# Patient Record
Sex: FEMALE | Race: BLACK OR AFRICAN AMERICAN | NOT HISPANIC OR LATINO | URBAN - METROPOLITAN AREA
[De-identification: names, ages, dates, MRNs, and addresses within clinical notes are randomized per-mention and may not be internally consistent; named-entity substitution may affect disease eponyms.]

---

## 2024-11-05 ENCOUNTER — APPOINTMENT (OUTPATIENT)
Dept: OBSTETRICS AND GYNECOLOGY | Facility: CLINIC | Age: 29
End: 2024-11-05
Payer: COMMERCIAL

## 2024-11-05 ENCOUNTER — LAB (OUTPATIENT)
Dept: LAB | Facility: LAB | Age: 29
End: 2024-11-05
Payer: COMMERCIAL

## 2024-11-05 VITALS — DIASTOLIC BLOOD PRESSURE: 64 MMHG | SYSTOLIC BLOOD PRESSURE: 120 MMHG | WEIGHT: 293 LBS

## 2024-11-05 DIAGNOSIS — O99.210 OBESITY IN PREGNANCY (HHS-HCC): ICD-10-CM

## 2024-11-05 DIAGNOSIS — Z3A.34 34 WEEKS GESTATION OF PREGNANCY (HHS-HCC): ICD-10-CM

## 2024-11-05 DIAGNOSIS — O99.013 ANEMIA AFFECTING PREGNANCY IN THIRD TRIMESTER (HHS-HCC): Primary | ICD-10-CM

## 2024-11-05 DIAGNOSIS — O26.873 SHORT CERVICAL LENGTH DURING PREGNANCY IN THIRD TRIMESTER: ICD-10-CM

## 2024-11-05 DIAGNOSIS — O30.043 DICHORIONIC DIAMNIOTIC TWIN PREGNANCY IN THIRD TRIMESTER (HHS-HCC): Primary | ICD-10-CM

## 2024-11-05 LAB
ABO GROUP (TYPE) IN BLOOD: NORMAL
ALBUMIN SERPL BCP-MCNC: 3.4 G/DL (ref 3.4–5)
ALP SERPL-CCNC: 105 U/L (ref 33–110)
ALT SERPL W P-5'-P-CCNC: 11 U/L (ref 7–45)
ANION GAP SERPL CALC-SCNC: 15 MMOL/L (ref 10–20)
ANTIBODY SCREEN: NORMAL
AST SERPL W P-5'-P-CCNC: 17 U/L (ref 9–39)
BILIRUB SERPL-MCNC: 0.7 MG/DL (ref 0–1.2)
BUN SERPL-MCNC: 7 MG/DL (ref 6–23)
C TRACH RRNA SPEC QL NAA+PROBE: NEGATIVE
CALCIUM SERPL-MCNC: 8.9 MG/DL (ref 8.6–10.6)
CHLORIDE SERPL-SCNC: 104 MMOL/L (ref 98–107)
CO2 SERPL-SCNC: 22 MMOL/L (ref 21–32)
CREAT SERPL-MCNC: 0.79 MG/DL (ref 0.5–1.05)
EGFRCR SERPLBLD CKD-EPI 2021: >90 ML/MIN/1.73M*2
ERYTHROCYTE [DISTWIDTH] IN BLOOD BY AUTOMATED COUNT: 13.2 % (ref 11.5–14.5)
FERRITIN SERPL-MCNC: 19 NG/ML
FOLATE SERPL-MCNC: 17.7 NG/ML
GLUCOSE SERPL-MCNC: 78 MG/DL (ref 74–99)
HBV CORE AB SER QL: NONREACTIVE
HBV SURFACE AB SER-ACNC: <3.1 MIU/ML
HBV SURFACE AG SERPL QL IA: NONREACTIVE
HCT VFR BLD AUTO: 32.5 % (ref 36–46)
HCV AB SER QL: NONREACTIVE
HGB BLD-MCNC: 10.2 G/DL (ref 12–16)
HIV 1+2 AB+HIV1 P24 AG SERPL QL IA: NONREACTIVE
IRON SATN MFR SERPL: NORMAL %
IRON SERPL-MCNC: 69 UG/DL
MCH RBC QN AUTO: 27.2 PG (ref 26–34)
MCHC RBC AUTO-ENTMCNC: 31.4 G/DL (ref 32–36)
MCV RBC AUTO: 87 FL (ref 80–100)
N GONORRHOEA DNA SPEC QL PROBE+SIG AMP: NEGATIVE
NRBC BLD-RTO: 0 /100 WBCS (ref 0–0)
PLATELET # BLD AUTO: 347 X10*3/UL (ref 150–450)
POTASSIUM SERPL-SCNC: 4.4 MMOL/L (ref 3.5–5.3)
PROT SERPL-MCNC: 6.6 G/DL (ref 6.4–8.2)
RBC # BLD AUTO: 3.75 X10*6/UL (ref 4–5.2)
REFLEX ADDED, ANEMIA PANEL: NORMAL
RH FACTOR (ANTIGEN D): NORMAL
RUBV IGG SERPL IA-ACNC: 1.6 IA
RUBV IGG SERPL QL IA: POSITIVE
SODIUM SERPL-SCNC: 137 MMOL/L (ref 136–145)
TIBC SERPL-MCNC: NORMAL UG/DL
TREPONEMA PALLIDUM IGG+IGM AB [PRESENCE] IN SERUM OR PLASMA BY IMMUNOASSAY: NONREACTIVE
UIBC SERPL-MCNC: >450 UG/DL
VIT B12 SERPL-MCNC: 411 PG/ML
WBC # BLD AUTO: 9.7 X10*3/UL (ref 4.4–11.3)

## 2024-11-05 PROCEDURE — 90471 IMMUNIZATION ADMIN: CPT | Performed by: STUDENT IN AN ORGANIZED HEALTH CARE EDUCATION/TRAINING PROGRAM

## 2024-11-05 PROCEDURE — 82746 ASSAY OF FOLIC ACID SERUM: CPT

## 2024-11-05 PROCEDURE — 86803 HEPATITIS C AB TEST: CPT

## 2024-11-05 PROCEDURE — 86706 HEP B SURFACE ANTIBODY: CPT

## 2024-11-05 PROCEDURE — 86900 BLOOD TYPING SEROLOGIC ABO: CPT

## 2024-11-05 PROCEDURE — 86850 RBC ANTIBODY SCREEN: CPT

## 2024-11-05 PROCEDURE — 87340 HEPATITIS B SURFACE AG IA: CPT

## 2024-11-05 PROCEDURE — 83021 HEMOGLOBIN CHROMOTOGRAPHY: CPT

## 2024-11-05 PROCEDURE — 86704 HEP B CORE ANTIBODY TOTAL: CPT

## 2024-11-05 PROCEDURE — 82728 ASSAY OF FERRITIN: CPT

## 2024-11-05 PROCEDURE — 85027 COMPLETE CBC AUTOMATED: CPT

## 2024-11-05 PROCEDURE — 87086 URINE CULTURE/COLONY COUNT: CPT

## 2024-11-05 PROCEDURE — 36415 COLL VENOUS BLD VENIPUNCTURE: CPT

## 2024-11-05 PROCEDURE — 99204 OFFICE O/P NEW MOD 45 MIN: CPT | Performed by: STUDENT IN AN ORGANIZED HEALTH CARE EDUCATION/TRAINING PROGRAM

## 2024-11-05 PROCEDURE — 83550 IRON BINDING TEST: CPT

## 2024-11-05 PROCEDURE — 90715 TDAP VACCINE 7 YRS/> IM: CPT | Performed by: STUDENT IN AN ORGANIZED HEALTH CARE EDUCATION/TRAINING PROGRAM

## 2024-11-05 PROCEDURE — 86901 BLOOD TYPING SEROLOGIC RH(D): CPT

## 2024-11-05 PROCEDURE — 80053 COMPREHEN METABOLIC PANEL: CPT

## 2024-11-05 PROCEDURE — 83020 HEMOGLOBIN ELECTROPHORESIS: CPT | Performed by: STUDENT IN AN ORGANIZED HEALTH CARE EDUCATION/TRAINING PROGRAM

## 2024-11-05 PROCEDURE — 86317 IMMUNOASSAY INFECTIOUS AGENT: CPT

## 2024-11-05 PROCEDURE — 82607 VITAMIN B-12: CPT

## 2024-11-05 PROCEDURE — 87389 HIV-1 AG W/HIV-1&-2 AB AG IA: CPT

## 2024-11-05 PROCEDURE — 87591 N.GONORRHOEAE DNA AMP PROB: CPT

## 2024-11-05 PROCEDURE — 87491 CHLMYD TRACH DNA AMP PROBE: CPT

## 2024-11-05 PROCEDURE — 86780 TREPONEMA PALLIDUM: CPT

## 2024-11-05 PROCEDURE — 1036F TOBACCO NON-USER: CPT | Performed by: STUDENT IN AN ORGANIZED HEALTH CARE EDUCATION/TRAINING PROGRAM

## 2024-11-05 RX ORDER — FERROUS SULFATE 325(65) MG
325 TABLET ORAL
Qty: 30 TABLET | Refills: 11 | Status: SHIPPED | OUTPATIENT
Start: 2024-11-05 | End: 2025-11-05

## 2024-11-05 RX ORDER — ASCORBIC ACID, CHOLECALCIFEROL, .ALPHA.-TOCOPHEROL ACETATE, DL-, PYRIDOXINE, FOLIC ACID, CYANOCOBALAMIN, CALCIUM, FERROUS FUMARATE, MAGNESIUM, DOCONEXENT 85; 200; 10; 25; 1; 12; 140; 27; 45; 300 [IU]/1; [IU]/1; [IU]/1; [IU]/1; MG/1; UG/1; MG/1; MG/1; MG/1; MG/1
CAPSULE, GELATIN COATED ORAL
COMMUNITY
Start: 2024-10-29

## 2024-11-05 ASSESSMENT — EDINBURGH POSTNATAL DEPRESSION SCALE (EPDS)
I HAVE BEEN SO UNHAPPY THAT I HAVE HAD DIFFICULTY SLEEPING: NOT AT ALL
I HAVE LOOKED FORWARD WITH ENJOYMENT TO THINGS: AS MUCH AS I EVER DID
I HAVE BEEN SO UNHAPPY THAT I HAVE BEEN CRYING: NO, NEVER
THE THOUGHT OF HARMING MYSELF HAS OCCURRED TO ME: NEVER
THINGS HAVE BEEN GETTING ON TOP OF ME: NO, I HAVE BEEN COPING AS WELL AS EVER
I HAVE FELT SCARED OR PANICKY FOR NO GOOD REASON: NO, NOT AT ALL
TOTAL SCORE: 0
I HAVE BEEN ABLE TO LAUGH AND SEE THE FUNNY SIDE OF THINGS: AS MUCH AS I ALWAYS COULD
I HAVE FELT SAD OR MISERABLE: NO, NOT AT ALL
I HAVE BLAMED MYSELF UNNECESSARILY WHEN THINGS WENT WRONG: NO, NEVER
I HAVE BEEN ANXIOUS OR WORRIED FOR NO GOOD REASON: NO, NOT AT ALL

## 2024-11-05 ASSESSMENT — ENCOUNTER SYMPTOMS
PSYCHIATRIC NEGATIVE: 0
GASTROINTESTINAL NEGATIVE: 0
CONSTITUTIONAL NEGATIVE: 0
ENDOCRINE NEGATIVE: 0
EYES NEGATIVE: 0
NEUROLOGICAL NEGATIVE: 0
HEMATOLOGIC/LYMPHATIC NEGATIVE: 0
MUSCULOSKELETAL NEGATIVE: 0
ALLERGIC/IMMUNOLOGIC NEGATIVE: 0
CARDIOVASCULAR NEGATIVE: 0
RESPIRATORY NEGATIVE: 0

## 2024-11-05 NOTE — PROGRESS NOTES
Alcides Argueta is a 29 y.o. female who is here for an initial OB visit.    Pregnancy notable for:  - di di twin pregnancy  - childhood asthma, not meds currently  - short cervical length, US at OSH with 19mm cervix without funneling  - obesity    Previous pregnancy history:   Thyroid Disease: denies  History of chronic hypertension: denies  History of pre-existing diabetes: denies  Social History     Substance and Sexual Activity   Sexual Activity Not Currently     FamHx: HTN, DM  FamHx Breast/Ov/Colon cancer: cousin breast ca  STIs: none  HSV: denies  Sexual, physical, mental abuse: denies  Occupation: denies  Taking prenatals, not on ASA.    Objective   /64   Wt 136 kg (300 lb)   LMP 03/12/2024 (Exact Date)   Physical Exam  Constitutional:       Appearance: Normal appearance.   HENT:      Head: Normocephalic and atraumatic.      Mouth/Throat:      Mouth: Mucous membranes are moist.   Eyes:      Extraocular Movements: Extraocular movements intact.      Conjunctiva/sclera: Conjunctivae normal.      Pupils: Pupils are equal, round, and reactive to light.   Pulmonary:      Effort: Pulmonary effort is normal.   Abdominal:      General: Abdomen is flat.      Palpations: Abdomen is soft.   Musculoskeletal:         General: Normal range of motion.      Cervical back: Normal range of motion.   Neurological:      General: No focal deficit present.      Mental Status: She is alert.   Skin:     General: Skin is warm and dry.   Psychiatric:         Mood and Affect: Mood normal.         Behavior: Behavior normal.         Thought Content: Thought content normal.         Judgment: Judgment normal.         Orders Placed This Encounter   Procedures    Urine culture     Order Specific Question:   Release result to [employer]     Answer:   Immediate [1]    US MAC OB imaging order     Standing Status:   Future     Standing Expiration Date:   11/5/2025     Order Specific Question:   Reason for exam:     Answer:   anatomy,  late transfer of care, twins     Order Specific Question:   Radiologist to Determine Optimal Study     Answer:   Yes     Order Specific Question:   Release result to MyChart     Answer:   Immediate [1]     Order Specific Question:   Is this exam part of a Research Study? If Yes, link this order to the research study     Answer:   No    Tdap vaccine, age 7 years and older  (BOOSTRIX)    C. trachomatis / N. gonorrhoeae, Amplified     Order Specific Question:   Release result to MyChart     Answer:   Immediate    CBC Anemia Panel With Reflex,Pregnancy     Standing Status:   Future     Standing Expiration Date:   11/5/2025     Order Specific Question:   Release result to MyChart     Answer:   Immediate [1]    Hemoglobin Identification with Path Review     Standing Status:   Future     Standing Expiration Date:   11/5/2025     Order Specific Question:   Release result to MyChart     Answer:   Immediate [1]    Hepatitis B Core Antibody, Total     Standing Status:   Future     Standing Expiration Date:   11/5/2025     Order Specific Question:   Release result to MyChart     Answer:   Immediate [1]    Hepatitis B surface Ag     Standing Status:   Future     Standing Expiration Date:   11/5/2025     Order Specific Question:   Release result to MyChart     Answer:   Immediate [1]    Hepatitis B Surface Antibody     Standing Status:   Future     Standing Expiration Date:   11/5/2025     Order Specific Question:   Release result to MyChart     Answer:   Immediate [1]    Hepatitis C Antibody     Standing Status:   Future     Standing Expiration Date:   11/5/2025     Order Specific Question:   Release result to MyChart     Answer:   Immediate [1]    HIV 1/2 Antigen/Antibody Screen with Reflex to Confirmation     Standing Status:   Future     Standing Expiration Date:   11/5/2025     Order Specific Question:   Release result to MyChart     Answer:   Immediate [1]    Rubella IgG     Standing Status:   Future     Standing Expiration  Date:   11/5/2025     Order Specific Question:   Release result to MyChart     Answer:   Immediate [1]    Syphilis Screen with Reflex     Standing Status:   Future     Standing Expiration Date:   11/5/2025     Order Specific Question:   Release result to MyChart     Answer:   Immediate [1]    Type And Screen     Standing Status:   Future     Standing Expiration Date:   11/5/2025     Order Specific Question:   Release result to MyChart     Answer:   Immediate [1]    Protein, Urine Random     Order Specific Question:   Release result to MyChart     Answer:   Immediate [1]    Comprehensive Metabolic Panel     Standing Status:   Future     Standing Expiration Date:   11/5/2025     Order Specific Question:   Release result to MyChart     Answer:   Immediate [1]       Problem List Items Addressed This Visit       34 weeks gestation of pregnancy ([facility])    Overview     Desired provider in labor: [] CNM  [x] Physician  [x] Blood Products: [x] Yes, accepts [] No, needs counseling  [x] Initial BMI: Could not be calculated   [] Prenatal Labs: unable to review, ordered  [] Cervical Cancer Screening: unable to see in chart review  [] Rh status:   [] Genetic Screening:    [] NT US: (11-13 wks)  [] Baby ASA (if indicated):  [] Pregnancy dated by:     [] Anatomy US: (19-20 wks)  [] Federal Sterilization consent signed (if indicated):  [x] 1hr GCT at 24-28wks: 111 (reviewed in chart)  [] Rhogam (if indicated):   [] Fetal Surveillance (if indicated):  [x] Tdap (27-32 wks, may be given up to 36 wks if initial window missed): 11/5  [] RSV (32-36 wks) (Sept. to end of Jan): do not have in the office  [] Flu Vaccine: uncertain    [] Breastfeeding:  [] Postpartum Birth control method: declines  [] GBS at 36 - 37 wks:  [] 39 weeks discussion of IOL vs. Expectant management:  [] Mode of delivery ( anticipated ):          Relevant Orders    C. trachomatis / N. gonorrhoeae, Amplified    CBC Anemia Panel With Reflex,Pregnancy    Hemoglobin  Identification with Path Review    Hepatitis B Core Antibody, Total    Hepatitis B surface Ag    Hepatitis B Surface Antibody    Hepatitis C Antibody    HIV 1/2 Antigen/Antibody Screen with Reflex to Confirmation    Rubella IgG    Syphilis Screen with Reflex    Type And Screen    Urine culture    Protein, Urine Random    Comprehensive Metabolic Panel    US MAC OB imaging order    Follow Up In Obstetrics    Short cervical length during pregnancy in third trimester    Obesity in pregnancy ([facility])    Dichorionic diamniotic twin pregnancy in third trimester ([facility]) - Primary      Labs & imaging ordered  Thank you for coming to your OB visit for your pregnancy. Follow up in 1 week.     Tete De La Rosa MD

## 2024-11-06 ENCOUNTER — HOSPITAL ENCOUNTER (OUTPATIENT)
Dept: RADIOLOGY | Facility: HOSPITAL | Age: 29
Discharge: HOME | End: 2024-11-06
Payer: COMMERCIAL

## 2024-11-06 DIAGNOSIS — Z3A.34 34 WEEKS GESTATION OF PREGNANCY (HHS-HCC): ICD-10-CM

## 2024-11-06 LAB
BACTERIA UR CULT: NORMAL
HEMOGLOBIN A2: 1.7 % (ref 2–3.5)
HEMOGLOBIN A: 96.8 % (ref 95.8–98)
HEMOGLOBIN F: 0.3 % (ref 0–2)
HEMOGLOBIN IDENTIFICATION INTERPRETATION: ABNORMAL
HEMOGLOBIN OTHER: 1.2 %
PATH REVIEW-HGB IDENTIFICATION: ABNORMAL

## 2024-11-06 PROCEDURE — 76819 FETAL BIOPHYS PROFIL W/O NST: CPT

## 2024-11-06 PROCEDURE — 76811 OB US DETAILED SNGL FETUS: CPT

## 2024-11-06 PROCEDURE — 76811 OB US DETAILED SNGL FETUS: CPT | Performed by: OBSTETRICS & GYNECOLOGY

## 2024-11-06 PROCEDURE — 76817 TRANSVAGINAL US OBSTETRIC: CPT | Performed by: OBSTETRICS & GYNECOLOGY

## 2024-11-06 PROCEDURE — 76812 OB US DETAILED ADDL FETUS: CPT | Performed by: OBSTETRICS & GYNECOLOGY

## 2024-11-06 PROCEDURE — 76819 FETAL BIOPHYS PROFIL W/O NST: CPT | Performed by: OBSTETRICS & GYNECOLOGY

## 2024-11-11 ENCOUNTER — ROUTINE PRENATAL (OUTPATIENT)
Dept: OBSTETRICS AND GYNECOLOGY | Facility: HOSPITAL | Age: 29
End: 2024-11-11
Payer: COMMERCIAL

## 2024-11-11 VITALS — WEIGHT: 293 LBS | SYSTOLIC BLOOD PRESSURE: 123 MMHG | DIASTOLIC BLOOD PRESSURE: 79 MMHG

## 2024-11-11 DIAGNOSIS — O99.013 ANEMIA AFFECTING PREGNANCY IN THIRD TRIMESTER (HHS-HCC): ICD-10-CM

## 2024-11-11 DIAGNOSIS — O30.043 DICHORIONIC DIAMNIOTIC TWIN PREGNANCY IN THIRD TRIMESTER (HHS-HCC): ICD-10-CM

## 2024-11-11 DIAGNOSIS — Z3A.34 34 WEEKS GESTATION OF PREGNANCY (HHS-HCC): Primary | ICD-10-CM

## 2024-11-11 PROCEDURE — 99214 OFFICE O/P EST MOD 30 MIN: CPT | Mod: TH | Performed by: STUDENT IN AN ORGANIZED HEALTH CARE EDUCATION/TRAINING PROGRAM

## 2024-11-11 PROCEDURE — 90678 RSV VACC PREF BIVALENT IM: CPT | Performed by: STUDENT IN AN ORGANIZED HEALTH CARE EDUCATION/TRAINING PROGRAM

## 2024-11-11 PROCEDURE — 87081 CULTURE SCREEN ONLY: CPT | Performed by: STUDENT IN AN ORGANIZED HEALTH CARE EDUCATION/TRAINING PROGRAM

## 2024-11-11 RX ORDER — POLYETHYLENE GLYCOL 3350 17 G/17G
17 POWDER, FOR SOLUTION ORAL DAILY
Qty: 510 G | Refills: 0 | Status: SHIPPED | OUTPATIENT
Start: 2024-11-11

## 2024-11-11 RX ORDER — POLYETHYLENE GLYCOL 3350 17 G/17G
17 POWDER, FOR SOLUTION ORAL DAILY
Qty: 30 PACKET | Refills: 0 | Status: SHIPPED | OUTPATIENT
Start: 2024-11-11 | End: 2024-11-11

## 2024-11-11 NOTE — PROGRESS NOTES
Ob Visit  24     SUBJECTIVE    HPI: Austin Argueta is a 29 y.o.  at 34w6d here for RPNV.   Having some stable pelvic pain, declines exam.    OBJECTIVE  Visit Vitals  /79   Wt 136 kg (300 lb)   LMP 2024 (Exact Date)   OB Status Pregnant   Smoking Status Never      ASSESSMENT & PLAN    Austin Argueta is a 29 y.o.  at 34w6d here for the following concerns we addressed today:    Problem List Items Addressed This Visit       34 weeks gestation of pregnancy ([facility]) - Primary    Overview     Desired provider in labor: [] CNM  [x] Physician  [x] Blood Products: [x] Yes, accepts [] No, needs counseling  [x] Initial BMI: Could not be calculated   [x] Prenatal Labs: reviewed  [] Cervical Cancer Screening: unable to see in chart review  [x] Rh status: +  [] Genetic Screening:    [] NT US: (11-13 wks)  [] Baby ASA (if indicated):  [x] Pregnancy dated by: LMP cw OSH dating (& cw our 34wk scan)    [x] Anatomy US: (19-20 wks): late anatomy at [facility]  [] Federal Sterilization consent signed (if indicated):  [x] 1hr GCT at 24-28wks: 111 (reviewed in chart)  [] Rhogam (if indicated):   [] Fetal Surveillance (if indicated):  [x] Tdap (27-32 wks, may be given up to 36 wks if initial window missed):   [x] RSV (32-36 wks) (Sept. to end of ):   [] Flu Vaccine: uncertain    [] Breastfeeding:  [] Postpartum Birth control method: declines  [] GBS at 36 - 37 wks:   [] IOL vs. Expectant management: labor induction at 38wks  [] Mode of delivery ( anticipated ):          Relevant Orders    Group B Streptococcus (GBS) Prenatal Screen, Culture    Follow Up In Obstetrics    Dichorionic diamniotic twin pregnancy in third trimester ([facility])    Overview     Cephalic/transverse. Concordant growth at 34 weeks, repeat evaluation scheduled.   Would desires IOL & breech extraction if needed.          Anemia affecting pregnancy in third trimester ([facility])    Overview     Hgb 10.2 on initial transfer labs.    PO iron, miralax for constipation         Relevant Medications    polyethylene glycol (Glycolax, Miralax) 17 gram packet        GBS & RSV today. RTC in 1 week.       Tete De La Rosa MD

## 2024-11-13 LAB — GP B STREP GENITAL QL CULT: NORMAL

## 2024-11-18 ENCOUNTER — APPOINTMENT (OUTPATIENT)
Dept: OBSTETRICS AND GYNECOLOGY | Facility: HOSPITAL | Age: 29
End: 2024-11-18
Payer: COMMERCIAL

## 2024-11-18 VITALS — DIASTOLIC BLOOD PRESSURE: 74 MMHG | SYSTOLIC BLOOD PRESSURE: 131 MMHG | WEIGHT: 293 LBS

## 2024-11-18 DIAGNOSIS — O30.043 DICHORIONIC DIAMNIOTIC TWIN PREGNANCY IN THIRD TRIMESTER (HHS-HCC): ICD-10-CM

## 2024-11-18 DIAGNOSIS — Z3A.35 35 WEEKS GESTATION OF PREGNANCY (HHS-HCC): Primary | ICD-10-CM

## 2024-11-18 PROCEDURE — 99214 OFFICE O/P EST MOD 30 MIN: CPT | Mod: TH | Performed by: STUDENT IN AN ORGANIZED HEALTH CARE EDUCATION/TRAINING PROGRAM

## 2024-11-18 PROCEDURE — 99214 OFFICE O/P EST MOD 30 MIN: CPT | Performed by: STUDENT IN AN ORGANIZED HEALTH CARE EDUCATION/TRAINING PROGRAM

## 2024-11-18 NOTE — PROGRESS NOTES
Ob Visit  24     SUBJECTIVE      HPI: Austin Argueta is a 29 y.o.  at 35w6d here for RPNV.  She has no contractions, no bleeding, or no LOF. Reports normal fetal movement. Patient reports has not been able to  her iron pill yet. Also considering c/s as opposed to IOL. Will consider throughout this week.     OBJECTIVE  Visit Vitals  LMP 2024 (Exact Date)   OB Status Pregnant   Smoking Status Never      ASSESSMENT & PLAN    Austin Argueta is a 29 y.o.  at 35w6d here for the following concerns we addressed today:    Problem List Items Addressed This Visit       35 weeks gestation of pregnancy ([facility]) - Primary    Overview     Desired provider in labor: [] CNM  [x] Physician  [x] Blood Products: [x] Yes, accepts [] No, needs counseling  [x] Initial BMI: Could not be calculated   [x] Prenatal Labs: reviewed  [] Cervical Cancer Screening: unable to see in chart review but pt reported normal 2024  [x] Rh status: +  [] Genetic Screening:    [] NT US: (11-13 wks)  [] Baby ASA (if indicated):  [x] Pregnancy dated by: LMP cw OSH dating (& cw our 34wk scan)    [x] Anatomy US: (19-20 wks): late anatomy at [facility]  [] Federal Sterilization consent signed (if indicated):  [x] 1hr GCT at 24-28wks: 111 (reviewed in chart)  [] Rhogam (if indicated):   [] Fetal Surveillance (if indicated):  [x] Tdap (27-32 wks, may be given up to 36 wks if initial window missed):   [x] RSV (32-36 wks) (Sept. to end of ):   [] Flu Vaccine: uncertain    [] Breastfeeding:  [] Postpartum Birth control method: declines  [x] GBS at 36 - 37 wks: neg  [] IOL vs. Expectant management: labor induction at 38wks  [] Mode of delivery ( anticipated ):          Relevant Orders    Follow Up In Obstetrics    Dichorionic diamniotic twin pregnancy in third trimester ([facility])    Overview     Cephalic/transverse. Concordant growth at 34 weeks, repeat evaluation scheduled.   Would desires IOL & breech extraction if needed.           Relevant Orders    Follow Up In Obstetrics       RTC in 1 week. Will schedule 38wk c/s vs induction at that time. Has repeat US on 11/29.    Tete De La Rosa MD

## 2024-11-25 ENCOUNTER — HOSPITAL ENCOUNTER (OUTPATIENT)
Facility: HOSPITAL | Age: 29
Setting detail: SURGERY ADMIT
End: 2024-11-25
Attending: STUDENT IN AN ORGANIZED HEALTH CARE EDUCATION/TRAINING PROGRAM | Admitting: STUDENT IN AN ORGANIZED HEALTH CARE EDUCATION/TRAINING PROGRAM
Payer: COMMERCIAL

## 2024-11-25 ENCOUNTER — PREP FOR PROCEDURE (OUTPATIENT)
Dept: OBSTETRICS AND GYNECOLOGY | Facility: HOSPITAL | Age: 29
End: 2024-11-25

## 2024-11-25 ENCOUNTER — ROUTINE PRENATAL (OUTPATIENT)
Dept: OBSTETRICS AND GYNECOLOGY | Facility: HOSPITAL | Age: 29
End: 2024-11-25
Payer: COMMERCIAL

## 2024-11-25 VITALS — WEIGHT: 293 LBS | DIASTOLIC BLOOD PRESSURE: 86 MMHG | SYSTOLIC BLOOD PRESSURE: 142 MMHG

## 2024-11-25 DIAGNOSIS — O30.043 DICHORIONIC DIAMNIOTIC TWIN PREGNANCY IN THIRD TRIMESTER (HHS-HCC): Primary | ICD-10-CM

## 2024-11-25 DIAGNOSIS — Z3A.36 36 WEEKS GESTATION OF PREGNANCY (HHS-HCC): ICD-10-CM

## 2024-11-25 DIAGNOSIS — O16.3 ELEVATED BLOOD PRESSURE AFFECTING PREGNANCY IN THIRD TRIMESTER, ANTEPARTUM (HHS-HCC): Primary | ICD-10-CM

## 2024-11-25 PROCEDURE — 99214 OFFICE O/P EST MOD 30 MIN: CPT | Performed by: STUDENT IN AN ORGANIZED HEALTH CARE EDUCATION/TRAINING PROGRAM

## 2024-11-25 PROCEDURE — 99214 OFFICE O/P EST MOD 30 MIN: CPT | Mod: TH | Performed by: STUDENT IN AN ORGANIZED HEALTH CARE EDUCATION/TRAINING PROGRAM

## 2024-11-25 RX ORDER — ACETAMINOPHEN 500 MG
1 TABLET ORAL 2 TIMES DAILY
Qty: 1 KIT | Refills: 0 | Status: ON HOLD | OUTPATIENT
Start: 2024-11-25

## 2024-11-25 ASSESSMENT — ENCOUNTER SYMPTOMS
LOSS OF SENSATION IN FEET: 0
DEPRESSION: 0
OCCASIONAL FEELINGS OF UNSTEADINESS: 0

## 2024-11-25 NOTE — PROGRESS NOTES
Ob Visit  24     SUBJECTIVE      HPI: Austin Argueta is a 29 y.o.  at 36w6d here for RPNV.  She has no contractions, no bleeding, or no LOF. Reports normal fetal movement.  Denies PEC sympts.    OBJECTIVE  Visit Vitals  /86   Wt 138 kg (305 lb)   LMP 2024 (Exact Date)   OB Status Pregnant   Smoking Status Never      ASSESSMENT & PLAN    Austin Argueta is a 29 y.o.  at 36w6d here for the following concerns we addressed today:    Problem List Items Addressed This Visit       36 weeks gestation of pregnancy ([facility])    Overview     Desired provider in labor: [] CNM  [x] Physician  [x] Blood Products: [x] Yes, accepts [] No, needs counseling  [x] Initial BMI: Could not be calculated   [x] Prenatal Labs: reviewed  [] Cervical Cancer Screening: unable to see in chart review but pt reported normal 2024  [x] Rh status: +  [] Genetic Screening:    [] NT US: (11-13 wks)  [] Baby ASA (if indicated):  [x] Pregnancy dated by: LMP cw OSH dating (& cw our 34wk scan)    [x] Anatomy US: (19-20 wks): late anatomy at [facility]  [] Federal Sterilization consent signed (if indicated):  [x] 1hr GCT at 24-28wks: 111 (reviewed in chart)  [] Rhogam (if indicated):   [] Fetal Surveillance (if indicated):  [x] Tdap (27-32 wks, may be given up to 36 wks if initial window missed):   [x] RSV (32-36 wks) (Sept. to end of ):   [] Flu Vaccine: uncertain    [] Breastfeeding:  [] Postpartum Birth control method: declines  [x] GBS at 36 - 37 wks: neg  [x] IOL vs. Expectant management:  delivery at 38wks  [x] Mode of delivery ( anticipated ):          Relevant Orders    Follow Up In Obstetrics    Follow Up In Obstetrics    Elevated blood pressure affecting pregnancy in third trimester, antepartum ([facility]) - Primary    Relevant Medications    blood pressure monitor (Blood Pressure Kit) kit    Other Relevant Orders    Follow Up In Obstetrics    Follow Up In Obstetrics       Counseled patient on  elevated BP in pregnancy and discussed s/s preeclampsia. Sent BP kit to pharmacy today, she understands to take BP 2x daily. RTC Friday for BP check at time of other testing.  CANDIDA in 1 week. Schedule  delivery today for 38wks (patient desires  instead of labor induction).     Tete De La Rosa MD

## 2024-11-29 ENCOUNTER — ANESTHESIA (OUTPATIENT)
Dept: OBSTETRICS AND GYNECOLOGY | Facility: HOSPITAL | Age: 29
End: 2024-11-29
Payer: COMMERCIAL

## 2024-11-29 ENCOUNTER — HOSPITAL ENCOUNTER (INPATIENT)
Facility: HOSPITAL | Age: 29
End: 2024-11-29
Attending: STUDENT IN AN ORGANIZED HEALTH CARE EDUCATION/TRAINING PROGRAM | Admitting: STUDENT IN AN ORGANIZED HEALTH CARE EDUCATION/TRAINING PROGRAM
Payer: COMMERCIAL

## 2024-11-29 ENCOUNTER — HOSPITAL ENCOUNTER (OUTPATIENT)
Dept: RADIOLOGY | Facility: HOSPITAL | Age: 29
Discharge: HOME | End: 2024-11-29
Payer: COMMERCIAL

## 2024-11-29 ENCOUNTER — ANESTHESIA EVENT (OUTPATIENT)
Dept: OBSTETRICS AND GYNECOLOGY | Facility: HOSPITAL | Age: 29
End: 2024-11-29
Payer: COMMERCIAL

## 2024-11-29 DIAGNOSIS — O99.013 ANEMIA AFFECTING PREGNANCY IN THIRD TRIMESTER (HHS-HCC): ICD-10-CM

## 2024-11-29 DIAGNOSIS — O09.299 HX OF MATERNAL LACERATION, 3RD DEGREE, CURRENTLY PREGNANT (HHS-HCC): ICD-10-CM

## 2024-11-29 DIAGNOSIS — O30.049: ICD-10-CM

## 2024-11-29 DIAGNOSIS — O30.043 DICHORIONIC DIAMNIOTIC TWIN PREGNANCY IN THIRD TRIMESTER (HHS-HCC): ICD-10-CM

## 2024-11-29 DIAGNOSIS — Z3A.37 37 WEEKS GESTATION OF PREGNANCY (HHS-HCC): ICD-10-CM

## 2024-11-29 DIAGNOSIS — Z3A.34 34 WEEKS GESTATION OF PREGNANCY (HHS-HCC): ICD-10-CM

## 2024-11-29 PROBLEM — O13.3 GESTATIONAL HYPERTENSION, THIRD TRIMESTER (HHS-HCC): Status: ACTIVE | Noted: 2024-11-25

## 2024-11-29 PROBLEM — I10 HTN (HYPERTENSION): Status: ACTIVE | Noted: 2024-11-29

## 2024-11-29 LAB
ABO GROUP (TYPE) IN BLOOD: NORMAL
ALBUMIN SERPL BCP-MCNC: 3.5 G/DL (ref 3.4–5)
ALP SERPL-CCNC: 132 U/L (ref 33–110)
ALT SERPL W P-5'-P-CCNC: 10 U/L (ref 7–45)
ANION GAP SERPL CALC-SCNC: 16 MMOL/L (ref 10–20)
ANTIBODY SCREEN: NORMAL
AST SERPL W P-5'-P-CCNC: 18 U/L (ref 9–39)
BILIRUB SERPL-MCNC: 0.6 MG/DL (ref 0–1.2)
BLOOD EXPIRATION DATE: NORMAL
BUN SERPL-MCNC: 11 MG/DL (ref 6–23)
CALCIUM SERPL-MCNC: 8.4 MG/DL (ref 8.6–10.6)
CHLORIDE SERPL-SCNC: 106 MMOL/L (ref 98–107)
CO2 SERPL-SCNC: 17 MMOL/L (ref 21–32)
CREAT SERPL-MCNC: 0.73 MG/DL (ref 0.5–1.05)
CREAT UR-MCNC: 215.7 MG/DL (ref 20–320)
DISPENSE STATUS: NORMAL
EGFRCR SERPLBLD CKD-EPI 2021: >90 ML/MIN/1.73M*2
ERYTHROCYTE [DISTWIDTH] IN BLOOD BY AUTOMATED COUNT: 14.5 % (ref 11.5–14.5)
GLUCOSE SERPL-MCNC: 69 MG/DL (ref 74–99)
HCT VFR BLD AUTO: 31.5 % (ref 36–46)
HGB BLD-MCNC: 9.8 G/DL (ref 12–16)
MCH RBC QN AUTO: 26.7 PG (ref 26–34)
MCHC RBC AUTO-ENTMCNC: 31.1 G/DL (ref 32–36)
MCV RBC AUTO: 86 FL (ref 80–100)
NRBC BLD-RTO: 0.2 /100 WBCS (ref 0–0)
PLATELET # BLD AUTO: 306 X10*3/UL (ref 150–450)
POTASSIUM SERPL-SCNC: 4.2 MMOL/L (ref 3.5–5.3)
PRODUCT BLOOD TYPE: 5100
PRODUCT CODE: NORMAL
PROT SERPL-MCNC: 6.1 G/DL (ref 6.4–8.2)
PROT UR-ACNC: 42 MG/DL (ref 5–24)
PROT/CREAT UR: 0.19 MG/MG CREAT (ref 0–0.17)
RBC # BLD AUTO: 3.67 X10*6/UL (ref 4–5.2)
RH FACTOR (ANTIGEN D): NORMAL
SODIUM SERPL-SCNC: 135 MMOL/L (ref 136–145)
TREPONEMA PALLIDUM IGG+IGM AB [PRESENCE] IN SERUM OR PLASMA BY IMMUNOASSAY: NONREACTIVE
UNIT ABO: NORMAL
UNIT NUMBER: NORMAL
UNIT RH: NORMAL
UNIT VOLUME: 320
WBC # BLD AUTO: 9.2 X10*3/UL (ref 4.4–11.3)
XM INTEP: NORMAL

## 2024-11-29 PROCEDURE — 7210000002 HC LABOR PER HOUR

## 2024-11-29 PROCEDURE — 1120000001 HC OB PRIVATE ROOM DAILY

## 2024-11-29 PROCEDURE — 76819 FETAL BIOPHYS PROFIL W/O NST: CPT

## 2024-11-29 PROCEDURE — 76820 UMBILICAL ARTERY ECHO: CPT

## 2024-11-29 PROCEDURE — 80053 COMPREHEN METABOLIC PANEL: CPT

## 2024-11-29 PROCEDURE — 86923 COMPATIBILITY TEST ELECTRIC: CPT

## 2024-11-29 PROCEDURE — 2500000001 HC RX 250 WO HCPCS SELF ADMINISTERED DRUGS (ALT 637 FOR MEDICARE OP)

## 2024-11-29 PROCEDURE — 2500000004 HC RX 250 GENERAL PHARMACY W/ HCPCS (ALT 636 FOR OP/ED)

## 2024-11-29 PROCEDURE — 36415 COLL VENOUS BLD VENIPUNCTURE: CPT

## 2024-11-29 PROCEDURE — 59050 FETAL MONITOR W/REPORT: CPT

## 2024-11-29 PROCEDURE — 86850 RBC ANTIBODY SCREEN: CPT

## 2024-11-29 PROCEDURE — 76816 OB US FOLLOW-UP PER FETUS: CPT

## 2024-11-29 PROCEDURE — 3E033VJ INTRODUCTION OF OTHER HORMONE INTO PERIPHERAL VEIN, PERCUTANEOUS APPROACH: ICD-10-PCS

## 2024-11-29 PROCEDURE — 3E0P7VZ INTRODUCTION OF HORMONE INTO FEMALE REPRODUCTIVE, VIA NATURAL OR ARTIFICIAL OPENING: ICD-10-PCS | Performed by: STUDENT IN AN ORGANIZED HEALTH CARE EDUCATION/TRAINING PROGRAM

## 2024-11-29 PROCEDURE — 82570 ASSAY OF URINE CREATININE: CPT

## 2024-11-29 PROCEDURE — 86780 TREPONEMA PALLIDUM: CPT

## 2024-11-29 PROCEDURE — 10907ZC DRAINAGE OF AMNIOTIC FLUID, THERAPEUTIC FROM PRODUCTS OF CONCEPTION, VIA NATURAL OR ARTIFICIAL OPENING: ICD-10-PCS

## 2024-11-29 PROCEDURE — 85027 COMPLETE CBC AUTOMATED: CPT

## 2024-11-29 RX ORDER — METHYLERGONOVINE MALEATE 0.2 MG/ML
0.2 INJECTION INTRAVENOUS ONCE AS NEEDED
Status: DISCONTINUED | OUTPATIENT
Start: 2024-11-29 | End: 2024-11-30 | Stop reason: HOSPADM

## 2024-11-29 RX ORDER — LIDOCAINE HYDROCHLORIDE 10 MG/ML
30 INJECTION, SOLUTION INFILTRATION; PERINEURAL ONCE AS NEEDED
Status: DISCONTINUED | OUTPATIENT
Start: 2024-11-29 | End: 2024-11-30 | Stop reason: HOSPADM

## 2024-11-29 RX ORDER — NIFEDIPINE 10 MG/1
10 CAPSULE ORAL ONCE AS NEEDED
Status: DISCONTINUED | OUTPATIENT
Start: 2024-11-29 | End: 2024-11-30 | Stop reason: HOSPADM

## 2024-11-29 RX ORDER — OXYTOCIN 10 [USP'U]/ML
10 INJECTION, SOLUTION INTRAMUSCULAR; INTRAVENOUS ONCE AS NEEDED
Status: DISCONTINUED | OUTPATIENT
Start: 2024-11-29 | End: 2024-11-30 | Stop reason: HOSPADM

## 2024-11-29 RX ORDER — ONDANSETRON 4 MG/1
4 TABLET, FILM COATED ORAL EVERY 6 HOURS PRN
Status: DISCONTINUED | OUTPATIENT
Start: 2024-11-29 | End: 2024-11-30

## 2024-11-29 RX ORDER — TERBUTALINE SULFATE 1 MG/ML
0.25 INJECTION SUBCUTANEOUS ONCE AS NEEDED
Status: DISCONTINUED | OUTPATIENT
Start: 2024-11-29 | End: 2024-11-30 | Stop reason: HOSPADM

## 2024-11-29 RX ORDER — CARBOPROST TROMETHAMINE 250 UG/ML
250 INJECTION, SOLUTION INTRAMUSCULAR ONCE AS NEEDED
Status: DISCONTINUED | OUTPATIENT
Start: 2024-11-29 | End: 2024-11-30 | Stop reason: HOSPADM

## 2024-11-29 RX ORDER — HYDRALAZINE HYDROCHLORIDE 20 MG/ML
5 INJECTION INTRAMUSCULAR; INTRAVENOUS ONCE AS NEEDED
Status: DISCONTINUED | OUTPATIENT
Start: 2024-11-29 | End: 2024-11-30 | Stop reason: HOSPADM

## 2024-11-29 RX ORDER — LOPERAMIDE HYDROCHLORIDE 2 MG/1
4 CAPSULE ORAL EVERY 2 HOUR PRN
Status: DISCONTINUED | OUTPATIENT
Start: 2024-11-29 | End: 2024-11-30 | Stop reason: HOSPADM

## 2024-11-29 RX ORDER — TRANEXAMIC ACID 100 MG/ML
1000 INJECTION, SOLUTION INTRAVENOUS ONCE AS NEEDED
Status: DISCONTINUED | OUTPATIENT
Start: 2024-11-29 | End: 2024-11-30 | Stop reason: HOSPADM

## 2024-11-29 RX ORDER — OXYTOCIN/0.9 % SODIUM CHLORIDE 30/500 ML
60 PLASTIC BAG, INJECTION (ML) INTRAVENOUS ONCE AS NEEDED
Status: COMPLETED | OUTPATIENT
Start: 2024-11-29 | End: 2024-11-30

## 2024-11-29 RX ORDER — MORPHINE SULFATE 2 MG/ML
2 INJECTION, SOLUTION INTRAMUSCULAR; INTRAVENOUS ONCE
Status: DISCONTINUED | OUTPATIENT
Start: 2024-11-29 | End: 2024-11-29

## 2024-11-29 RX ORDER — ONDANSETRON HYDROCHLORIDE 2 MG/ML
4 INJECTION, SOLUTION INTRAVENOUS EVERY 6 HOURS PRN
Status: DISCONTINUED | OUTPATIENT
Start: 2024-11-29 | End: 2024-11-30

## 2024-11-29 RX ORDER — LABETALOL HYDROCHLORIDE 5 MG/ML
20 INJECTION, SOLUTION INTRAVENOUS ONCE AS NEEDED
Status: DISCONTINUED | OUTPATIENT
Start: 2024-11-29 | End: 2024-11-30 | Stop reason: HOSPADM

## 2024-11-29 RX ORDER — OXYTOCIN/0.9 % SODIUM CHLORIDE 30/500 ML
2-30 PLASTIC BAG, INJECTION (ML) INTRAVENOUS CONTINUOUS
Status: DISCONTINUED | OUTPATIENT
Start: 2024-11-29 | End: 2024-11-30

## 2024-11-29 RX ORDER — METOCLOPRAMIDE HYDROCHLORIDE 5 MG/ML
10 INJECTION INTRAMUSCULAR; INTRAVENOUS EVERY 6 HOURS PRN
Status: DISCONTINUED | OUTPATIENT
Start: 2024-11-29 | End: 2024-11-30

## 2024-11-29 RX ORDER — MISOPROSTOL 200 UG/1
800 TABLET ORAL ONCE AS NEEDED
Status: COMPLETED | OUTPATIENT
Start: 2024-11-29 | End: 2024-11-30

## 2024-11-29 RX ORDER — METOCLOPRAMIDE 10 MG/1
10 TABLET ORAL EVERY 6 HOURS PRN
Status: DISCONTINUED | OUTPATIENT
Start: 2024-11-29 | End: 2024-11-30

## 2024-11-29 RX ORDER — NALBUPHINE HYDROCHLORIDE 10 MG/ML
10 INJECTION INTRAMUSCULAR; INTRAVENOUS; SUBCUTANEOUS ONCE
Status: COMPLETED | OUTPATIENT
Start: 2024-11-29 | End: 2024-11-29

## 2024-11-29 SDOH — SOCIAL STABILITY: SOCIAL INSECURITY
WITHIN THE LAST YEAR, HAVE YOU BEEN RAPED OR FORCED TO HAVE ANY KIND OF SEXUAL ACTIVITY BY YOUR PARTNER OR EX-PARTNER?: NO

## 2024-11-29 SDOH — HEALTH STABILITY: MENTAL HEALTH: HAVE YOU USED ANY PRESCRIPTION DRUGS OTHER THAN PRESCRIBED IN THE PAST 12 MONTHS?: NO

## 2024-11-29 SDOH — SOCIAL STABILITY: SOCIAL INSECURITY: WITHIN THE LAST YEAR, HAVE YOU BEEN AFRAID OF YOUR PARTNER OR EX-PARTNER?: NO

## 2024-11-29 SDOH — ECONOMIC STABILITY: FOOD INSECURITY: WITHIN THE PAST 12 MONTHS, YOU WORRIED THAT YOUR FOOD WOULD RUN OUT BEFORE YOU GOT THE MONEY TO BUY MORE.: NEVER TRUE

## 2024-11-29 SDOH — ECONOMIC STABILITY: HOUSING INSECURITY: DO YOU FEEL UNSAFE GOING BACK TO THE PLACE WHERE YOU ARE LIVING?: NO

## 2024-11-29 SDOH — ECONOMIC STABILITY: FOOD INSECURITY: WITHIN THE PAST 12 MONTHS, THE FOOD YOU BOUGHT JUST DIDN'T LAST AND YOU DIDN'T HAVE MONEY TO GET MORE.: NEVER TRUE

## 2024-11-29 SDOH — SOCIAL STABILITY: SOCIAL INSECURITY: VERBAL ABUSE: DENIES

## 2024-11-29 SDOH — HEALTH STABILITY: MENTAL HEALTH: WISH TO BE DEAD (PAST 1 MONTH): NO

## 2024-11-29 SDOH — SOCIAL STABILITY: SOCIAL INSECURITY: DO YOU FEEL ANYONE HAS EXPLOITED OR TAKEN ADVANTAGE OF YOU FINANCIALLY OR OF YOUR PERSONAL PROPERTY?: NO

## 2024-11-29 SDOH — SOCIAL STABILITY: SOCIAL INSECURITY: ABUSE SCREEN: ADULT

## 2024-11-29 SDOH — SOCIAL STABILITY: SOCIAL INSECURITY: HAVE YOU HAD THOUGHTS OF HARMING ANYONE ELSE?: NO

## 2024-11-29 SDOH — SOCIAL STABILITY: SOCIAL INSECURITY: PHYSICAL ABUSE: DENIES

## 2024-11-29 SDOH — SOCIAL STABILITY: SOCIAL INSECURITY: HAS ANYONE EVER THREATENED TO HURT YOUR FAMILY OR YOUR PETS?: NO

## 2024-11-29 SDOH — HEALTH STABILITY: MENTAL HEALTH: SUICIDAL BEHAVIOR (LIFETIME): NO

## 2024-11-29 SDOH — HEALTH STABILITY: MENTAL HEALTH: NON-SPECIFIC ACTIVE SUICIDAL THOUGHTS (PAST 1 MONTH): NO

## 2024-11-29 SDOH — SOCIAL STABILITY: SOCIAL INSECURITY
WITHIN THE LAST YEAR, HAVE YOU BEEN KICKED, HIT, SLAPPED, OR OTHERWISE PHYSICALLY HURT BY YOUR PARTNER OR EX-PARTNER?: NO

## 2024-11-29 SDOH — HEALTH STABILITY: MENTAL HEALTH: CURRENT SMOKER: 0

## 2024-11-29 SDOH — HEALTH STABILITY: MENTAL HEALTH: HAVE YOU USED ANY SUBSTANCES (CANABIS, COCAINE, HEROIN, HALLUCINOGENS, INHALANTS, ETC.) IN THE PAST 12 MONTHS?: NO

## 2024-11-29 SDOH — SOCIAL STABILITY: SOCIAL INSECURITY: ARE YOU OR HAVE YOU BEEN THREATENED OR ABUSED PHYSICALLY, EMOTIONALLY, OR SEXUALLY BY ANYONE?: NO

## 2024-11-29 SDOH — SOCIAL STABILITY: SOCIAL INSECURITY: DOES ANYONE TRY TO KEEP YOU FROM HAVING/CONTACTING OTHER FRIENDS OR DOING THINGS OUTSIDE YOUR HOME?: NO

## 2024-11-29 SDOH — SOCIAL STABILITY: SOCIAL INSECURITY: WITHIN THE LAST YEAR, HAVE YOU BEEN HUMILIATED OR EMOTIONALLY ABUSED IN OTHER WAYS BY YOUR PARTNER OR EX-PARTNER?: NO

## 2024-11-29 SDOH — SOCIAL STABILITY: SOCIAL INSECURITY: ARE THERE ANY APPARENT SIGNS OF INJURIES/BEHAVIORS THAT COULD BE RELATED TO ABUSE/NEGLECT?: NO

## 2024-11-29 SDOH — HEALTH STABILITY: MENTAL HEALTH: WERE YOU ABLE TO COMPLETE ALL THE BEHAVIORAL HEALTH SCREENINGS?: YES

## 2024-11-29 ASSESSMENT — LIFESTYLE VARIABLES
HOW MANY STANDARD DRINKS CONTAINING ALCOHOL DO YOU HAVE ON A TYPICAL DAY: PATIENT DOES NOT DRINK
AUDIT-C TOTAL SCORE: 0
AUDIT-C TOTAL SCORE: 0
SKIP TO QUESTIONS 9-10: 1
HOW OFTEN DO YOU HAVE A DRINK CONTAINING ALCOHOL: NEVER
HOW OFTEN DO YOU HAVE 6 OR MORE DRINKS ON ONE OCCASION: NEVER

## 2024-11-29 ASSESSMENT — PAIN SCALES - GENERAL
PAINLEVEL_OUTOF10: 0 - NO PAIN
PAINLEVEL_OUTOF10: 6
PAINLEVEL_OUTOF10: 0 - NO PAIN

## 2024-11-29 ASSESSMENT — PATIENT HEALTH QUESTIONNAIRE - PHQ9
2. FEELING DOWN, DEPRESSED OR HOPELESS: NOT AT ALL
1. LITTLE INTEREST OR PLEASURE IN DOING THINGS: NOT AT ALL
SUM OF ALL RESPONSES TO PHQ9 QUESTIONS 1 & 2: 0

## 2024-11-29 ASSESSMENT — ACTIVITIES OF DAILY LIVING (ADL)
LACK_OF_TRANSPORTATION: NO
LACK_OF_TRANSPORTATION: NO

## 2024-11-29 ASSESSMENT — PAIN DESCRIPTION - LOCATION: LOCATION: ABDOMEN

## 2024-11-29 ASSESSMENT — PAIN DESCRIPTION - ORIENTATION: ORIENTATION: LOWER

## 2024-11-29 NOTE — H&P
OB Admission H&P    Assessment/Plan    Austin Argueta is a 30 yo  at 37w3d by LMP c/w OSH dating c/w 34wk scan at  presenting for IOL in s/o new FGR diagnosis in di/di twin pregnancy at early term.     IOL   Pt indicated IOL by new dx of FGR in early term at US today  Admit to L&D, consented  T&S, CBC, and Syphilis  Epidural at patient request  Recheck as clinically indicated by maternal or fetal status  Plan to initiate induction with CRB/cyto   Pt educated on risks of breech extraction with Dr Alexander, pt accepting of risks and desiring vaginal delivery     gHTN   Pt with MRBP at outpatient visit last week and MRBP on admission today  Discussed risks of pre-eclampsia with patient   Discussed risk of HTN later in life and importance of establishing with PCP   HELLP labs and P:C ordered and pending  Pt asx at this time, discussed symptoms to look out for including HA, CP, SOB, RUQ pain and vision changes    Pregnancy notables  Fe deficiency anemia pt not taking PO iron with last Hb 10.2 on , admission Hb pending; T&C 1u   Di/di twins  gHTN see above  Short cervix    Fetal Status  -NST reactive, reassuring   -Presentation vertex/vertex based on ultrasound  -EFW by US  A: 2450g 5% AC 21%; B: 3000 32% AC 74%  -GBS neg    Postpartum  Contraception Plan: patient declined    Pregnancy Problems (from 24 to present)       Problem Noted Diagnosed Resolved    Gestational hypertension, third trimester ([facility]) 2024 by Tete De La Rosa MD  No    Priority:  Medium       Anemia affecting pregnancy in third trimester ([facility]) 2024 by Tete De La Rosa MD  No    Priority:  Medium       Overview Signed 2024 12:24 PM by Tete De La Rosa MD     Hgb 10.2 on initial transfer labs.   PO iron, miralax for constipation         36 weeks gestation of pregnancy ([facility]) 2024 by Tete De La Rosa MD  No    Priority:  Medium       Overview Addendum 2024 11:18 AM by Tete De La Rosa MD      Desired provider in labor: [] CNM  [x] Physician  [x] Blood Products: [x] Yes, accepts [] No, needs counseling  [x] Initial BMI: Could not be calculated   [x] Prenatal Labs: reviewed  [] Cervical Cancer Screening: unable to see in chart review but pt reported normal 2024  [x] Rh status: +  [] Genetic Screening:    [] NT US: (11-13 wks)  [] Baby ASA (if indicated):  [x] Pregnancy dated by: LMP cw OSH dating (& cw our 34wk scan)    [x] Anatomy US: (19-20 wks): late anatomy at [facility]  [] Federal Sterilization consent signed (if indicated):  [x] 1hr GCT at 24-28wks: 111 (reviewed in chart)  [] Rhogam (if indicated):   [] Fetal Surveillance (if indicated):  [x] Tdap (27-32 wks, may be given up to 36 wks if initial window missed):   [x] RSV (32-36 wks) (Sept. to end ):   [] Flu Vaccine: uncertain    [] Breastfeeding:  [] Postpartum Birth control method: declines  [x] GBS at 36 - 37 wks: neg  [x] IOL vs. Expectant management:  delivery at 38wks  [x] Mode of delivery ( anticipated ):          Short cervical length during pregnancy in third trimester 2024 by Tete De La Rosa MD  No    Priority:  Medium       Obesity in pregnancy ([facility]) 2024 by Tete De La Rosa MD  No    Priority:  Medium       Dichorionic diamniotic twin pregnancy in third trimester ([facility]) 2024 by Tete De La Rosa MD  No    Priority:  Medium       Overview Signed 2024 12:27 PM by Tete De La Rosa MD     Cephalic/transverse. Concordant growth at 34 weeks, repeat evaluation scheduled.   Would desires IOL & breech extraction if needed.                  Subjective   Good fetal movement.  Denies vaginal bleeding., Denies contractions., Denies leaking of fluid.  Denies HA, CP, SOB, RUQ pain and vision changes. Pt is reporting no complaints at this time.     Prenatal Provider Rj    OB History    Para Term  AB Living   1 0 0 0 0 0   SAB IAB Ectopic Multiple Live Births   0 0 0 0 0      # Outcome  Date GA Lbr Chidi/2nd Weight Sex Type Anes PTL Lv   1 Current                No past surgical history on file.    Social History     Tobacco Use    Smoking status: Never    Smokeless tobacco: Never   Substance Use Topics    Alcohol use: Not Currently       No Known Allergies    Medications Prior to Admission   Medication Sig Dispense Refill Last Dose/Taking    blood pressure monitor (Blood Pressure Kit) kit 1 each 2 times a day. 1 kit 0     ferrous sulfate, 325 mg ferrous sulfate, (FeroSuL) tablet Take 1 tablet (325 mg) by mouth once daily with breakfast. 30 tablet 11     PNV-DHA 27 mg iron-1 mg -300 mg capsule TAKE 1 CAPSULE BY ORAL ROUTE EVERY DAY (MAY SUBSTITUTE PER INSURANCE)       polyethylene glycol (Glycolax, Miralax) 17 gram/dose powder DISSOLVE 17 GRAMS IN 8 OZ OF FLUID LIQUID DRINK DAILY AS DIRECTED 510 g 0      Objective     Last Vitals  Temp Pulse Resp BP MAP O2 Sat   36.9 °C (98.4 °F) 90 16 (!) 143/86 109 97 %     Blood Pressures         11/29/2024  1237             BP: 143/86             Physical Exam  General: NAD, mood appropriate  Cardiopulmonary: warm and well perfused, breathing comfortably on room air  Abdomen: Gravid, non-tender  Extremities: Symmetric  Speculum Exam: deferred  Cervix: ft/50/-3     Fetal Monitoring  A:140/mod/+accels/+variable decel   B: 140/mod/+accel/-decel   Uterine Activity: No contractions seen on toco    Bedside ultrasound: Yes, vertex vertex with Dr Alexander    Labs in chart were reviewed. Hb 10.2 on 11/5       Prenatal labs reviewed, remarkable for Hb 10.2.

## 2024-11-29 NOTE — ANESTHESIA PREPROCEDURE EVALUATION
Patient: Austin Argueta    Evaluation Method: In-person visit    Procedure Information    Date: 11/29/24  Procedure: Labor Consult         Relevant Problems   Anesthesia (within normal limits)      Cardiac (within normal limits)      Pulmonary (within normal limits)      Neuro (within normal limits)      GI (within normal limits)      /Renal (within normal limits)      Liver (within normal limits)      Endocrine   (+) Obesity in pregnancy ([facility])      Hematology   (+) Anemia affecting pregnancy in third trimester ([facility])      Musculoskeletal (within normal limits)      HEENT (within normal limits)      ID (within normal limits)      Skin (within normal limits)      GYN   (+) 36 weeks gestation of pregnancy ([facility])   (+) Dichorionic diamniotic twin pregnancy in third trimester ([facility])      Circulatory   (+) Elevated blood pressure affecting pregnancy in third trimester, antepartum ([facility])       Clinical information reviewed:    Allergies  Meds               NPO Detail:  No data recorded     OB/Gyn Evaluation    Present Pregnancy    Patient is pregnant now.  (+) , multiple gestation   Obstetric History                Physical Exam    Airway  Mallampati: II  TM distance: >3 FB     Cardiovascular    Dental   Comments: Chipped front tooth   Pulmonary    Abdominal            Anesthesia Plan    History of general anesthesia?: no  History of complications of general anesthesia?: no    ASA 3     epidural     The patient is not a current smoker.    Anesthetic plan and risks discussed with patient.  Use of blood products discussed with patient who consented to blood products.    Plan discussed with CAA.

## 2024-11-29 NOTE — HOSPITAL COURSE
"O+  Di-di   ->  GBS-  Anticipated scheduled C/S at 38 weeks     complications: {perinatalcomps:78147}    Immunizations: RSV , missed Tdap, \"uncertain\" flu vaccine  Pregnancy history: none  Abnormal Labs: anemia   Ultrasounds: Di/di twins; Twin A with decreased interval fetal growth with EFW at 5%ile and AC at 21%ile, no malformations; Twin B with normal growth and no malformations  Pregnancy complications/maternal PMH: class III obesity, constipation, short cervix, anemia, elevated BP in 3rd trimester  Maternal meds: Fe, Miralax, PNV        "

## 2024-11-29 NOTE — PROGRESS NOTES
Intrapartum Progress Note    Assessment/Plan   Austin Hector Argueta is a 29 y.o.  at 37w3d with di/di twins. MARIFER: 2024, by Last Menstrual Period c/w OSH dating c/w 34wk scan at  presenting for IOL in s/o new FGR of twin A    Cervical ripening  -crb in place, cyto#2 placed  -will augment with pitocin and arom when appropriate  -epidural per patient request  -CEFM cat 1 currently x2  -GBS neg    gHTN  -MRBPs > 4 hrs apart  -PEC labs neg; P:C pending  -asymptomatic  -normotensive to low mild range    Di/di twin gestation  -US  A: EFW 2450g 5% AC 21%; B: EFW 3000 32% AC 74% '  -16% discordance  -FGR twin A  -pt counseled on breech extraction on admission and desires to proceed    Anemia  -admission hgb 9.8  -T&C 1 unit pRBC    Postpartum  -ppBC: declines    Seen and discussed with Dr. Melisa Quevedo MD PGY-2      Assessment & Plan  37 weeks gestation of pregnancy ([facility])    Pregnancy Problems (from 24 to present)       Problem Noted Diagnosed Resolved    37 weeks gestation of pregnancy ([facility]) 2024 by Negar Collins MD  No    Priority:  Medium       Gestational hypertension, third trimester ([facility]) 2024 by Tete De La Rosa MD  No    Priority:  Medium       Anemia affecting pregnancy in third trimester ([facility]) 2024 by Tete De La Rosa MD  No    Priority:  Medium       Overview Signed 2024 12:24 PM by Tete De La Rosa MD     Hgb 10.2 on initial transfer labs.   PO iron, miralax for constipation         36 weeks gestation of pregnancy ([facility]) 2024 by Tete De La Rosa MD  No    Priority:  Medium       Overview Addendum 2024 11:18 AM by Tete De La Rosa MD     Desired provider in labor: [] CNM  [x] Physician  [x] Blood Products: [x] Yes, accepts [] No, needs counseling  [x] Initial BMI: Could not be calculated   [x] Prenatal Labs: reviewed  [] Cervical Cancer Screening: unable to see in chart review but pt reported normal 2024  [x] Rh status:  +  [] Genetic Screening:    [] NT US: (11-13 wks)  [] Baby ASA (if indicated):  [x] Pregnancy dated by: LMP cw OSH dating (& cw our 34wk scan)    [x] Anatomy US: (19-20 wks): late anatomy at [facility]  [] Federal Sterilization consent signed (if indicated):  [x] 1hr GCT at 24-28wks: 111 (reviewed in chart)  [] Rhogam (if indicated):   [] Fetal Surveillance (if indicated):  [x] Tdap (27-32 wks, may be given up to 36 wks if initial window missed):   [x] RSV (32-36 wks) (Sept. to end ):   [] Flu Vaccine: uncertain    [] Breastfeeding:  [] Postpartum Birth control method: declines  [x] GBS at 36 - 37 wks: neg  [x] IOL vs. Expectant management:  delivery at 38wks  [x] Mode of delivery ( anticipated ):          Short cervical length during pregnancy in third trimester 2024 by Tete De La Rosa MD  No    Priority:  Medium       Obesity in pregnancy ([facility]) 2024 by Tete De La Rosa MD  No    Priority:  Medium       Dichorionic diamniotic twin pregnancy in third trimester ([facility]) 2024 by Tete De La Rosa MD  No    Priority:  Medium       Overview Signed 2024 12:27 PM by Tete De La Rosa MD     Cephalic/transverse. Concordant growth at 34 weeks, repeat evaluation scheduled.   Would desires IOL & breech extraction if needed.                  Subjective   Pt feeling well. Patient does not report headaches, visual changes, chest pain, shortness of breath or RUQ pain      Objective   Last Vitals:  Temp Pulse Resp BP MAP Pulse Ox   36.8 °C (98.2 °F) 72 17 127/70 92 100 %     Vitals Min/Max Last 24 Hours:  Temp  Min: 36.6 °C (97.9 °F)  Max: 36.9 °C (98.4 °F)  Pulse  Min: 63  Max: 90  Resp  Min: 16  Max: 17  BP  Min: 127/70  Max: 145/76  MAP (mmHg)  Min: 92  Max: 109    Intake/Output:  No intake or output data in the 24 hours ending 24 2699    Physical Examination:  FHT A: 137/mod/+accel/-decel  FHT B: 133/mod/+accel/-decel       Contraction Frequency:  occassional/irritability    CRB in place. Cyto #2 placed    Lab Review:  Lab Results   Component Value Date    WBC 9.2 11/29/2024    HGB 9.8 (L) 11/29/2024    HCT 31.5 (L) 11/29/2024     11/29/2024     Lab Results   Component Value Date    GLUCOSE 69 (L) 11/29/2024     (L) 11/29/2024    K 4.2 11/29/2024     11/29/2024    CO2 17 (L) 11/29/2024    ANIONGAP 16 11/29/2024    BUN 11 11/29/2024    CREATININE 0.73 11/29/2024    EGFR >90 11/29/2024    CALCIUM 8.4 (L) 11/29/2024    ALBUMIN 3.5 11/29/2024    PROT 6.1 (L) 11/29/2024    ALKPHOS 132 (H) 11/29/2024    ALT 10 11/29/2024    AST 18 11/29/2024    BILITOT 0.6 11/29/2024

## 2024-11-30 ENCOUNTER — ANESTHESIA EVENT (OUTPATIENT)
Dept: OBSTETRICS AND GYNECOLOGY | Facility: HOSPITAL | Age: 29
End: 2024-11-30
Payer: COMMERCIAL

## 2024-11-30 ENCOUNTER — ANESTHESIA (OUTPATIENT)
Dept: OBSTETRICS AND GYNECOLOGY | Facility: HOSPITAL | Age: 29
End: 2024-11-30
Payer: COMMERCIAL

## 2024-11-30 PROCEDURE — 2500000004 HC RX 250 GENERAL PHARMACY W/ HCPCS (ALT 636 FOR OP/ED)

## 2024-11-30 PROCEDURE — 59300 EPISIOTOMY OR VAGINAL REPAIR: CPT | Performed by: STUDENT IN AN ORGANIZED HEALTH CARE EDUCATION/TRAINING PROGRAM

## 2024-11-30 PROCEDURE — 2500000004 HC RX 250 GENERAL PHARMACY W/ HCPCS (ALT 636 FOR OP/ED): Performed by: STUDENT IN AN ORGANIZED HEALTH CARE EDUCATION/TRAINING PROGRAM

## 2024-11-30 PROCEDURE — 7210000002 HC LABOR PER HOUR

## 2024-11-30 PROCEDURE — 51701 INSERT BLADDER CATHETER: CPT

## 2024-11-30 PROCEDURE — 10H073Z INSERTION OF MONITORING ELECTRODE INTO PRODUCTS OF CONCEPTION, VIA NATURAL OR ARTIFICIAL OPENING: ICD-10-PCS

## 2024-11-30 PROCEDURE — 2500000001 HC RX 250 WO HCPCS SELF ADMINISTERED DRUGS (ALT 637 FOR MEDICARE OP): Performed by: FAMILY MEDICINE

## 2024-11-30 PROCEDURE — 2500000005 HC RX 250 GENERAL PHARMACY W/O HCPCS: Performed by: FAMILY MEDICINE

## 2024-11-30 PROCEDURE — 3700000014 EPIDURAL BLOCK: Mod: GC | Performed by: STUDENT IN AN ORGANIZED HEALTH CARE EDUCATION/TRAINING PROGRAM

## 2024-11-30 PROCEDURE — 2500000004 HC RX 250 GENERAL PHARMACY W/ HCPCS (ALT 636 FOR OP/ED): Performed by: FAMILY MEDICINE

## 2024-11-30 PROCEDURE — 0DQR0ZZ REPAIR ANAL SPHINCTER, OPEN APPROACH: ICD-10-PCS | Performed by: STUDENT IN AN ORGANIZED HEALTH CARE EDUCATION/TRAINING PROGRAM

## 2024-11-30 PROCEDURE — 59409 OBSTETRICAL CARE: CPT | Performed by: STUDENT IN AN ORGANIZED HEALTH CARE EDUCATION/TRAINING PROGRAM

## 2024-11-30 PROCEDURE — 88307 TISSUE EXAM BY PATHOLOGIST: CPT | Performed by: PATHOLOGY

## 2024-11-30 PROCEDURE — 4A1H7CZ MONITORING OF PRODUCTS OF CONCEPTION, CARDIAC RATE, VIA NATURAL OR ARTIFICIAL OPENING: ICD-10-PCS

## 2024-11-30 PROCEDURE — 3E0H7GC INTRODUCTION OF OTHER THERAPEUTIC SUBSTANCE INTO LOWER GI, VIA NATURAL OR ARTIFICIAL OPENING: ICD-10-PCS | Performed by: STUDENT IN AN ORGANIZED HEALTH CARE EDUCATION/TRAINING PROGRAM

## 2024-11-30 PROCEDURE — 2500000002 HC RX 250 W HCPCS SELF ADMINISTERED DRUGS (ALT 637 FOR MEDICARE OP, ALT 636 FOR OP/ED)

## 2024-11-30 PROCEDURE — 12042 INTMD RPR N-HF/GENIT2.6-7.5: CPT | Performed by: STUDENT IN AN ORGANIZED HEALTH CARE EDUCATION/TRAINING PROGRAM

## 2024-11-30 PROCEDURE — 3700000014 HC AN EPIDURAL BLOCK CHARGE: Performed by: STUDENT IN AN ORGANIZED HEALTH CARE EDUCATION/TRAINING PROGRAM

## 2024-11-30 PROCEDURE — 7100000016 HC LABOR RECOVERY PER HOUR: Performed by: STUDENT IN AN ORGANIZED HEALTH CARE EDUCATION/TRAINING PROGRAM

## 2024-11-30 PROCEDURE — 2720000007 HC OR 272 NO HCPCS: Performed by: STUDENT IN AN ORGANIZED HEALTH CARE EDUCATION/TRAINING PROGRAM

## 2024-11-30 PROCEDURE — 2500000001 HC RX 250 WO HCPCS SELF ADMINISTERED DRUGS (ALT 637 FOR MEDICARE OP)

## 2024-11-30 PROCEDURE — 88307 TISSUE EXAM BY PATHOLOGIST: CPT | Mod: TC,SUR

## 2024-11-30 PROCEDURE — 1210000001 HC SEMI-PRIVATE ROOM DAILY

## 2024-11-30 RX ORDER — CEFAZOLIN 1 G/1
INJECTION, POWDER, FOR SOLUTION INTRAVENOUS AS NEEDED
Status: DISCONTINUED | OUTPATIENT
Start: 2024-11-30 | End: 2024-11-30

## 2024-11-30 RX ORDER — FENTANYL/ROPIVACAINE/NS/PF 2MCG/ML-.2
0-25 PLASTIC BAG, INJECTION (ML) INJECTION CONTINUOUS
Status: DISCONTINUED | OUTPATIENT
Start: 2024-11-30 | End: 2024-11-30

## 2024-11-30 RX ORDER — LOPERAMIDE HYDROCHLORIDE 2 MG/1
4 CAPSULE ORAL EVERY 2 HOUR PRN
Status: DISCONTINUED | OUTPATIENT
Start: 2024-11-30 | End: 2024-12-03 | Stop reason: HOSPADM

## 2024-11-30 RX ORDER — MISOPROSTOL 200 UG/1
800 TABLET ORAL ONCE AS NEEDED
Status: DISCONTINUED | OUTPATIENT
Start: 2024-11-30 | End: 2024-12-03 | Stop reason: HOSPADM

## 2024-11-30 RX ORDER — DIPHENHYDRAMINE HCL 25 MG
25 CAPSULE ORAL EVERY 6 HOURS PRN
Status: DISCONTINUED | OUTPATIENT
Start: 2024-11-30 | End: 2024-12-03 | Stop reason: HOSPADM

## 2024-11-30 RX ORDER — ONDANSETRON 4 MG/1
4 TABLET, FILM COATED ORAL EVERY 6 HOURS PRN
Status: DISCONTINUED | OUTPATIENT
Start: 2024-11-30 | End: 2024-12-03 | Stop reason: HOSPADM

## 2024-11-30 RX ORDER — OXYTOCIN/0.9 % SODIUM CHLORIDE 30/500 ML
60 PLASTIC BAG, INJECTION (ML) INTRAVENOUS ONCE AS NEEDED
Status: DISCONTINUED | OUTPATIENT
Start: 2024-11-30 | End: 2024-12-03 | Stop reason: HOSPADM

## 2024-11-30 RX ORDER — POLYETHYLENE GLYCOL 3350 17 G/17G
17 POWDER, FOR SOLUTION ORAL 2 TIMES DAILY PRN
Status: DISCONTINUED | OUTPATIENT
Start: 2024-11-30 | End: 2024-12-03 | Stop reason: HOSPADM

## 2024-11-30 RX ORDER — DIPHENHYDRAMINE HYDROCHLORIDE 50 MG/ML
25 INJECTION INTRAMUSCULAR; INTRAVENOUS EVERY 6 HOURS PRN
Status: DISCONTINUED | OUTPATIENT
Start: 2024-11-30 | End: 2024-12-03 | Stop reason: HOSPADM

## 2024-11-30 RX ORDER — TRANEXAMIC ACID 100 MG/ML
1000 INJECTION, SOLUTION INTRAVENOUS ONCE AS NEEDED
Status: DISCONTINUED | OUTPATIENT
Start: 2024-11-30 | End: 2024-12-03 | Stop reason: HOSPADM

## 2024-11-30 RX ORDER — LIDOCAINE HCL/EPINEPHRINE/PF 2%-1:200K
VIAL (ML) INJECTION AS NEEDED
Status: DISCONTINUED | OUTPATIENT
Start: 2024-11-30 | End: 2024-11-30

## 2024-11-30 RX ORDER — LABETALOL HYDROCHLORIDE 5 MG/ML
20 INJECTION, SOLUTION INTRAVENOUS ONCE AS NEEDED
Status: DISCONTINUED | OUTPATIENT
Start: 2024-11-30 | End: 2024-12-03 | Stop reason: HOSPADM

## 2024-11-30 RX ORDER — ADHESIVE BANDAGE
10 BANDAGE TOPICAL
Status: DISCONTINUED | OUTPATIENT
Start: 2024-11-30 | End: 2024-12-03 | Stop reason: HOSPADM

## 2024-11-30 RX ORDER — NIFEDIPINE 10 MG/1
10 CAPSULE ORAL ONCE AS NEEDED
Status: DISCONTINUED | OUTPATIENT
Start: 2024-11-30 | End: 2024-12-03 | Stop reason: HOSPADM

## 2024-11-30 RX ORDER — ONDANSETRON HYDROCHLORIDE 2 MG/ML
4 INJECTION, SOLUTION INTRAVENOUS EVERY 6 HOURS PRN
Status: DISCONTINUED | OUTPATIENT
Start: 2024-11-30 | End: 2024-12-03 | Stop reason: HOSPADM

## 2024-11-30 RX ORDER — OXYCODONE HYDROCHLORIDE 5 MG/1
5 TABLET ORAL ONCE
Status: COMPLETED | OUTPATIENT
Start: 2024-11-30 | End: 2024-11-30

## 2024-11-30 RX ORDER — SIMETHICONE 80 MG
80 TABLET,CHEWABLE ORAL 4 TIMES DAILY PRN
Status: DISCONTINUED | OUTPATIENT
Start: 2024-11-30 | End: 2024-12-03 | Stop reason: HOSPADM

## 2024-11-30 RX ORDER — HYDRALAZINE HYDROCHLORIDE 20 MG/ML
5 INJECTION INTRAMUSCULAR; INTRAVENOUS ONCE AS NEEDED
Status: DISCONTINUED | OUTPATIENT
Start: 2024-11-30 | End: 2024-12-03 | Stop reason: HOSPADM

## 2024-11-30 RX ORDER — OXYTOCIN 10 [USP'U]/ML
10 INJECTION, SOLUTION INTRAMUSCULAR; INTRAVENOUS ONCE AS NEEDED
Status: DISCONTINUED | OUTPATIENT
Start: 2024-11-30 | End: 2024-12-03 | Stop reason: HOSPADM

## 2024-11-30 RX ORDER — METRONIDAZOLE 500 MG/100ML
500 INJECTION, SOLUTION INTRAVENOUS ONCE
Status: COMPLETED | OUTPATIENT
Start: 2024-11-30 | End: 2024-11-30

## 2024-11-30 RX ORDER — METHYLERGONOVINE MALEATE 0.2 MG/ML
0.2 INJECTION INTRAVENOUS ONCE AS NEEDED
Status: DISCONTINUED | OUTPATIENT
Start: 2024-11-30 | End: 2024-12-03 | Stop reason: HOSPADM

## 2024-11-30 RX ORDER — POLYETHYLENE GLYCOL 3350 17 G/17G
17 POWDER, FOR SOLUTION ORAL 2 TIMES DAILY
Status: DISCONTINUED | OUTPATIENT
Start: 2024-11-30 | End: 2024-12-03 | Stop reason: HOSPADM

## 2024-11-30 RX ORDER — IBUPROFEN 600 MG/1
600 TABLET ORAL EVERY 6 HOURS
Status: DISCONTINUED | OUTPATIENT
Start: 2024-11-30 | End: 2024-12-03 | Stop reason: HOSPADM

## 2024-11-30 RX ORDER — ACETAMINOPHEN 325 MG/1
975 TABLET ORAL EVERY 6 HOURS
Status: DISCONTINUED | OUTPATIENT
Start: 2024-11-30 | End: 2024-12-03 | Stop reason: HOSPADM

## 2024-11-30 RX ORDER — BISACODYL 10 MG/1
10 SUPPOSITORY RECTAL DAILY PRN
Status: DISCONTINUED | OUTPATIENT
Start: 2024-11-30 | End: 2024-12-03 | Stop reason: HOSPADM

## 2024-11-30 RX ORDER — LIDOCAINE 560 MG/1
1 PATCH PERCUTANEOUS; TOPICAL; TRANSDERMAL
Status: DISCONTINUED | OUTPATIENT
Start: 2024-11-30 | End: 2024-12-03 | Stop reason: HOSPADM

## 2024-11-30 RX ORDER — FENTANYL CITRATE 50 UG/ML
INJECTION, SOLUTION INTRAMUSCULAR; INTRAVENOUS AS NEEDED
Status: DISCONTINUED | OUTPATIENT
Start: 2024-11-30 | End: 2024-11-30

## 2024-11-30 RX ORDER — SODIUM CHLORIDE, SODIUM LACTATE, POTASSIUM CHLORIDE, CALCIUM CHLORIDE 600; 310; 30; 20 MG/100ML; MG/100ML; MG/100ML; MG/100ML
INJECTION, SOLUTION INTRAVENOUS CONTINUOUS PRN
Status: DISCONTINUED | OUTPATIENT
Start: 2024-11-30 | End: 2024-11-30

## 2024-11-30 RX ORDER — OXYCODONE HYDROCHLORIDE 5 MG/1
5 TABLET ORAL EVERY 6 HOURS PRN
Status: DISCONTINUED | OUTPATIENT
Start: 2024-11-30 | End: 2024-12-03 | Stop reason: HOSPADM

## 2024-11-30 RX ORDER — ENOXAPARIN SODIUM 100 MG/ML
60 INJECTION SUBCUTANEOUS EVERY 24 HOURS
Status: DISCONTINUED | OUTPATIENT
Start: 2024-12-01 | End: 2024-12-03 | Stop reason: HOSPADM

## 2024-11-30 RX ORDER — CARBOPROST TROMETHAMINE 250 UG/ML
250 INJECTION, SOLUTION INTRAMUSCULAR ONCE AS NEEDED
Status: DISCONTINUED | OUTPATIENT
Start: 2024-11-30 | End: 2024-12-03 | Stop reason: HOSPADM

## 2024-11-30 SDOH — HEALTH STABILITY: MENTAL HEALTH: CURRENT SMOKER: 0

## 2024-11-30 ASSESSMENT — PAIN SCALES - GENERAL
PAIN_LEVEL: 0
PAINLEVEL_OUTOF10: 4
PAINLEVEL_OUTOF10: 0 - NO PAIN
PAINLEVEL_OUTOF10: 4
PAINLEVEL_OUTOF10: 0 - NO PAIN
PAINLEVEL_OUTOF10: 7
PAINLEVEL_OUTOF10: 8
PAINLEVEL_OUTOF10: 0 - NO PAIN
PAINLEVEL_OUTOF10: 4
PAINLEVEL_OUTOF10: 0 - NO PAIN
PAINLEVEL_OUTOF10: 6
PAINLEVEL_OUTOF10: 8
PAINLEVEL_OUTOF10: 0 - NO PAIN
PAINLEVEL_OUTOF10: 6

## 2024-11-30 ASSESSMENT — PAIN DESCRIPTION - LOCATION
LOCATION: ABDOMEN
LOCATION: ABDOMEN

## 2024-11-30 NOTE — SIGNIFICANT EVENT
CRB out. Will start pitocin when able (4hrs from last cytotec). Discussed AROM as next step in labor induction. Patient desires epidural first.    Cervical Exam  Dilation: 3  Effacement (%): 60  Fetal Station: -3  OB Examiner: MD Sae.  A: 130/mod/+accel/-decel  B: 136/mod/+accel/-decel       Contraction Frequency: occassional/irritability      Will reassess after epidural    Elizabeth Quevedo MD PGY-2

## 2024-11-30 NOTE — OP NOTE
Date: 2024  [place] [facility] [place]    Name: Austin Argueta, : 1995, Age: 29 y.o., MRN: [id number], Sex: female    Diagnosis  Pre-op Diagnosis      * Type 3b perineal laceration during delivery ([facility]) [O70.22] Post-op Diagnosis     * Type 3b perineal laceration during delivery ([facility]) [O70.22]     Procedures  Perineal Laceration Repair  42459 - SC EPISIOTOMY/VAG RPR OTH/THN ATTENDING      Surgeons      * Stephy Miranda - Primary    Resident/Fellow/Other Assistant:  Surgeons and Role:  * No surgeons found with a matching role *    Staff:   Scrub Person: Kelli  Scrub Person: Tresa    Anesthesia Staff: Anesthesiologist: Mauricio Peters DO  Anesthesia Resident: Melva Nix DO    Procedure Summary  Anesthesia: Anesthesia type not filed in the log.  ASA: ASA status not filed in the log.  Estimated Blood Loss: 500 mL (delivery + laceration repair)  Intra-op Medications:   Administrations occurring from 0846 to 1150 on 24:   Medication Name Total Dose   fentaNYL-ROPivacaine-NaCl (PF) 2-0.2 mcg/mL-% epidural infusion 97 mL   oxytocin (Pitocin) infusion in sodium chloride 0.9% 30 units/500 mL 4,588 lan-units   miSOPROStoL (Cytotec) tablet 800 mcg 800 mcg   oxytocin (Pitocin) bolus from bag 18,000 lan-units   oxytocin (Pitocin) infusion in sodium chloride 0.9% 30 units/500 mL Cannot be calculated              Anesthesia Record               Intraprocedure I/O Totals       None           Specimen: No specimens collected              Procedure Details:  The patient was seen in the preoperative area. The site of surgery was properly noted/marked if necessary per policy. The patient has been actively warmed in preoperative area. Preoperative antibiotics have been ordered and given within 1 hours of incision. Venous thrombosis prophylaxis are not indicated.    Findings: 3b laceration at posterior aspect of the sphincter, extending into deep R sulcal     Transfer to OR for better visualization after  vaginal delivery of twins. Laceration examined and wound explored. Laceration extended from posterior aspect of external anal sphincter into a deep sulcal on the right, about 4 cm in length. Rectal exam performed intermittently throughout repair. Sulcal laceration repaired with running stitch of 2-0 Vicryl in 2 layers. Interrupted sutures used re approximate the posterior aspect of the external anal sphincter. Remainder of laceration repaired in standard 2nd degree procedure. 4-0 Monocryl used to re approximate small labial tear.     Complications:  None; patient tolerated the procedure well.     Disposition: PACU - hemodynamically stable.  Condition: stable

## 2024-11-30 NOTE — ANESTHESIA PROCEDURE NOTES
Epidural Block    Patient location during procedure: OB  Start time: 11/30/2024 1:00 AM  End time: 11/30/2024 2:17 AM  Reason for block: labor analgesia  Staffing  Performed: attending and resident   Authorized by: Bethany Lemus MD    Performed by: Bethany Lemus MD    Preanesthetic Checklist  Completed: patient identified, IV checked, risks and benefits discussed, surgical consent, pre-op evaluation, timeout performed and sterile techniques followed  Block Timeout  RN/Licensed healthcare professional reads aloud to the Anesthesia provider and entire team: Patient identity, procedure with side and site, patient position, and as applicable the availability of implants/special equipment/special requirements.  Patient on coagulant treatment: no  Timeout performed at: 11/30/2024 1:02 AM  Block Placement  Patient position: sitting  Prep: ChloraPrep  Sterility prep: drape and gloves  Sedation level: no sedation  Patient monitoring: blood pressure and continuous pulse oximetry  Approach: midline  Local numbing: lidocaine 1% to skin and subcutaneous tissues  Vertebral space: lumbar  Lumbar location: L4-L5  Epidural  Loss of resistance technique: saline  Guidance: landmark technique        Needle  Needle type: Tuohy   Needle gauge: 17  Needle length: 10 cm  Needle insertion depth: 8 cm  Catheter type: multi-orifice  Catheter size: 18 G  Catheter at skin depth: 11 cm  Catheter securement method: clear occlusive dressing    Test dose: lidocaine 1.5% with epinephrine 1-to-200,000  Test dose: lidocaine 1.5% with epinephrine 1-to-200,000  Test dose result: no positive test dose    PCEA  Medication concentration used: 0.2% Ropivacaine with 2 mcg/mL Fentanyl  Dose (mL): 5  Lockout (minutes): 30  1-Hour Limit (boluses/hr): 2  Basal Rate: 10        Assessment  Sensory level: T10 bilateral  Block outcome: pain improved  Number of attempts: 2  Events: no positive test dose  Procedure assessment: patient tolerated procedure  well with no immediate complications

## 2024-11-30 NOTE — SIGNIFICANT EVENT
Called to bedside by RN for bleeding    Nurse reports a ~4in clot passed into toilet while urinating with c/f possible continued trickling of blood from vagina    Called Dr Castillo and Dr Read to bedside for BSUS  Endometrial stripe thin, low c/f retained products in uterus    Pt's 3b lac evaluated without oozing or active bleeding appreciated    Changed pad and discussed s/sx to watch for  Encouraged pt to let RN know if any heavy bleeding or clots are seen    Pt verbalized understanding    Anne Tomas, APRN-CNP

## 2024-11-30 NOTE — SIGNIFICANT EVENT
"Labor Progress Note    Subjective:   Patient resting comfortably in bed    Objective:  Vitals:  /71   Pulse 86   Temp 36.5 °C (97.7 °F)   Resp 17   Ht 1.702 m (5' 7\")   Wt 138 kg (305 lb 5.4 oz)   LMP 03/12/2024 (Exact Date)   SpO2 99%   BMI 47.82 kg/m²   FHTs:A: baseline 131 moderate variability, +accels, -decels  B: baseline 136 moderate variability, +accels, -decels  [place]: ctx q2min  SVE: 6/80/0    A/P:    Active labor  - Titrate pitocin per protocol, currently at 6  - s/p AROM  - Epidural infusing  - FSE A cat 1 currently; CEFM B Cat 1 currently  - GBS neg      Discussed with Dr. Kylah Quevedo MD PGY-2      "

## 2024-11-30 NOTE — SIGNIFICANT EVENT
Called to bedside by RN for pt c/o pain    Pt had  of di/di twins today @ 0935 c/b 3b lac with deep R sulcal laceration repair    Reports sharp, burning pain in the perineum, extending towards buttocks and rectum    Discussed options for pain control and through shared decision making will order 1 time dose of Oxy 5 now  Encouraged to use ice pack jeremi pads, tucks pads, and dermoplast    Pt verbalizes understanding, all questions answered  Recommended and discussed [facility] clinic for recovery    Anne Tomas, APRN-CNP

## 2024-11-30 NOTE — ANESTHESIA POSTPROCEDURE EVALUATION
Patient: Austin Argueta    Procedure Summary       Date: 11/30/24 Room / Location:     Anesthesia Start: 1055 Anesthesia Stop: 1154    Procedure: Procedure Not Yet Scheduled Diagnosis:     Scheduled Providers:  Responsible Provider:     Anesthesia Type: Not recorded ASA Status: Not recorded            Anesthesia Type: No value filed.      Visit Vitals  /65   Pulse 80   Temp 36.6 °C (97.9 °F)   Resp 16        Anesthesia Post Evaluation    Patient location during evaluation: floor  Patient participation: complete - patient participated  Level of consciousness: awake  Pain score: 0  Pain management: adequate  Airway patency: patent  Cardiovascular status: acceptable  Respiratory status: acceptable  Hydration status: acceptable  Postoperative Nausea and Vomiting: none        No notable events documented.     Progress Note - Hospitalist   Name: Agnes Martin 49 y.o. female I MRN: [id number]  Unit/Bed#: Togus VA Medical 

## 2024-11-30 NOTE — ANESTHESIA PREPROCEDURE EVALUATION
Patient: Austin Argueta    Evaluation Method: In-person visit    Procedure Information    Date: 11/29/24  Procedure: Labor Consult         Relevant Problems   Anesthesia (within normal limits)      Cardiac (within normal limits)      Pulmonary (within normal limits)      Neuro (within normal limits)      GI (within normal limits)      /Renal (within normal limits)      Liver (within normal limits)      Endocrine   (+) Obesity in pregnancy ([facility])      Hematology   (+) Anemia affecting pregnancy in third trimester ([facility])      Musculoskeletal (within normal limits)      HEENT (within normal limits)      ID (within normal limits)      Skin (within normal limits)      GYN   (+) 36 weeks gestation of pregnancy ([facility])   (+) 37 weeks gestation of pregnancy ([facility])   (+) Dichorionic diamniotic twin pregnancy in third trimester ([facility])       Clinical information reviewed:    Allergies  Meds               NPO Detail:  No data recorded     OB/Gyn Evaluation    Present Pregnancy    Patient is pregnant now.  (+) , multiple gestation   Obstetric History                Physical Exam    Airway  Mallampati: II  TM distance: >3 FB     Cardiovascular    Dental   Comments: Chipped front tooth   Pulmonary    Abdominal            Anesthesia Plan    History of general anesthesia?: no  History of complications of general anesthesia?: no    ASA 3     epidural     The patient is not a current smoker.    Anesthetic plan and risks discussed with patient.  Use of blood products discussed with patient who consented to blood products.    Plan discussed with CAA.

## 2024-11-30 NOTE — DISCHARGE INSTRUCTIONS

## 2024-11-30 NOTE — ANESTHESIA PREPROCEDURE EVALUATION
Patient: Austin Argueta    Evaluation Method: In-person visit    Procedure Information    Date: 11/29/24  Procedure: Labor Consult         Relevant Problems   Anesthesia (within normal limits)      Cardiac (within normal limits)      Pulmonary (within normal limits)      Neuro (within normal limits)      GI (within normal limits)      /Renal (within normal limits)      Liver (within normal limits)      Endocrine   (+) Obesity in pregnancy ([facility])      Hematology   (+) Anemia affecting pregnancy in third trimester ([facility])      Musculoskeletal (within normal limits)      HEENT (within normal limits)      ID (within normal limits)      Skin (within normal limits)      GYN   (+) 36 weeks gestation of pregnancy ([facility])   (+) 37 weeks gestation of pregnancy ([facility])   (+) Dichorionic diamniotic twin pregnancy in third trimester ([facility])       Clinical information reviewed:    Allergies  Meds               NPO Detail:  No data recorded     OB/Gyn Evaluation    Present Pregnancy    Patient is pregnant now.  (+) , multiple gestation   Obstetric History                Physical Exam    Airway  Mallampati: II  TM distance: >3 FB     Cardiovascular    Dental   Comments: Chipped front tooth   Pulmonary    Abdominal            Anesthesia Plan    History of general anesthesia?: no  History of complications of general anesthesia?: no    ASA 3     The patient is not a current smoker.    Anesthetic plan and risks discussed with patient.  Use of blood products discussed with patient who consented to blood products.    Plan discussed with CAA.

## 2024-11-30 NOTE — L&D DELIVERY NOTE
OB Delivery Note  2024  Austin Hector Argueta  29 y.o.      Ellie Louie [[id number]]   Vaginal, Spontaneous      Ellie Ethel [[id number]]   Vaginal, Spontaneous       Gestational Age: 37w4d  /Para:   Quantitative Blood Loss: Admission to Discharge: 500 mL (2024 12:05 PM - 2024 12:42 PM)    EllieLouie sotomayor [[id number]]      Labor Events    Sac identifier: Sac 1  Rupture date/time: 2024  Rupture type: Artificial  Fluid color: Clear  Fluid odor: None  Labor type: Induced Onset of Labor  Labor allowed to proceed with plans for an attempted vaginal birth?: Yes  Induction: [place]/EASI, Misoprostol  First cervical ripening date/time: 2024  Induction date/time: 2024 153  Induction indications: Hypertensive Disorder of Pregnancy  Complications: Uterine Atony       Labor Event Times    Labor onset date/time: 2024 1530  Dilation complete date/time: 2024 0846  Start pushing date/time: 2024 0904       Labor Length    1st stage: 17h 16m  2nd stage: 0h 49m  3rd stage: 0h 40m       Placenta    Placenta delivery date/time: 2024 1015  Placenta removal: Spontaneous  Placenta appearance: Intact  Placenta disposition: pathology       Cord    Vessels: 3 vessels  Complications: None  Delayed cord clamping?: Yes  Gases sent?: No  Stem cell collection (by provider): No       Lacerations    Episiotomy: None  Perineal laceration: 3rd, 3b  Periurethral laceration?: Yes  Periurethral laceration location: right  Periurethral laceration repaired?: No  Repair suture: 2-0 Synthetic Suture       Anesthesia    Method: Epidural       Operative Delivery    Forceps attempted?: No  Vacuum extractor attempted?: No       Shoulder Dystocia    Shoulder dystocia present?: No       [place] Delivery    Birth date/time: 2024 09:35:00  Delivery type: Vaginal, Spontaneous  Complications: Uterine Atony       Resuscitation    Method: Suctioning,  Tactile stimulation       Apgars    Living status: Living  Apgar Component Scores:  1 min.:  5 min.:  10 min.:  15 min.:  20 min.:    Skin color:  0  1       Heart rate:  2  2       Reflex irritability:  2  2       Muscle tone:  2  2       Respiratory effort:  2  2       Total:  8  9       Apgars assigned by: MISTI       Delivery Providers    Delivering clinician: Stephy Miranda MD   Provider Role    Ashlee Farr RN Delivery Nurse    Markus Law, RN Nursery Nurse    Ilana Barry MD Resident               Destin ArguetaWDanelle [[id number]]      Labor Events    Sac identifier: Sac 2  Rupture date/time: 2024 0938  Rupture type: Artificial  Fluid color: Clear  Fluid odor: None  Labor type: Induced Onset of Labor  Labor allowed to proceed with plans for an attempted vaginal birth?: Yes  Induction: [place]/EASI, Misoprostol  First cervical ripening date/time: 2024  Induction date/time: 2024 153  Induction indications: Hypertensive Disorder of Pregnancy  Complications: Uterine Atony       Labor Event Times    Labor onset date/time: 2024 1530  Dilation complete date/time: 2024 0846  Start pushing date/time: 2024 0904       Labor Length    1st stage: 17h 16m  2nd stage: 1h 21m  3rd stage: 0h 08m       Placenta    Placenta delivery date/time: 2024 1015  Placenta removal: Spontaneous  Placenta appearance: Intact  Placenta disposition: pathology       Cord    Vessels: 3 vessels  Complications: None  Delayed cord clamping?: Yes  Cord blood disposition: Lab  Gases sent?: No  Stem cell collection (by provider): No       Lacerations    Episiotomy: None  Perineal laceration: 3rd, 3b  Periurethral laceration?: Yes  Periurethral laceration location: right  Periurethral laceration repaired?: No  Repair suture: 2-0 Synthetic Suture       Anesthesia    Method: Epidural       Operative Delivery    Forceps attempted?: No  Vacuum extractor attempted?: No       Shoulder Dystocia     Shoulder dystocia present?: No        Delivery    Birth date/time: 2024 10:07:00  Delivery type: Vaginal, Spontaneous  Complications: Uterine Atony       Resuscitation    Method: Tactile stimulation, Suctioning       Apgars    Living status: Living  Apgar Component Scores:  1 min.:  5 min.:  10 min.:  15 min.:  20 min.:    Skin color:  0  1       Heart rate:  2  2       Reflex irritability:  2  2       Muscle tone:  2  2       Respiratory effort:  2  2       Total:  8  9       Apgars assigned by: ELYSSA BENDER RN       Delivery Providers    Delivering clinician: Stephy Miranda MD   Provider Role    Ashlee Farr, RN Delivery Nurse    Gloria Bender, RN Nursery Nurse    Ilana Barry MD Resident               Uncomplicated  x2. After delivery of twin A, cervix checked and found to be 7 cm. Shortly after, found to be complete, and twin B delivered without issue. Placenta delivered spontaneously and intact with gentle traction on umbilical cord. Bimanual exam performed and clot evacuated. Cytotec given. Tone improved.    Laceration extending from perineum into deep R sulcal. Rectal exams performed intermittently throughout repair. Abx given. Recommend ERAD clinic for recovery.     Stephy Miranda MD

## 2024-11-30 NOTE — SIGNIFICANT EVENT
"AROM      Vitals:  /83   Pulse 87   Temp 36.1 °C (97 °F) (Temporal)   Resp 20   Ht 1.702 m (5' 7\")   Wt 138 kg (305 lb 5.4 oz)   LMP 03/12/2024 (Exact Date)   SpO2 98%   BMI 47.82 kg/m²   FHTs: A: baseline 129 moderate variability, +accels, -decels  B: baseline 136 moderate variability, +accels, -decels  [place]: irritable  SVE: 4/60/-3    Fetal head palpated with no other presenting parts. AROM for clear fluid.      A/P:    Latent labor  - will start pitocin  - s/p AROM  - epidural available per patient request  - CEFM; Cat 1 currently x2  - GBS neg      Elizabeth Quevedo MD PGY-2      "

## 2024-11-30 NOTE — SIGNIFICANT EVENT
"At bedside to evaluate patient after KURTIS De Oliveira-CNP alerted MD team about additional postpartum bleeding.    Patient is PPD0 s/p NSVDx2 of twin gestation complicated by a 3b laceration with deep R sulcal laceration.   Patient states that she went up to the bathroom and there was blood in the toilet, uncertain how much blood there was. States that there was a clot as well, uncertain how large. Having some pain in her bottom.    BP (!) 145/87   Pulse 92   Temp 37.7 °C (99.9 °F) (Oral)   Resp 18   Ht 1.702 m (5' 7\")   Wt 138 kg (305 lb 5.4 oz)   LMP 03/12/2024 (Exact Date)   SpO2 98%   Breastfeeding Unknown   BMI 47.82 kg/m²   Bedside ultrasound performed with thin endometrial stripe, no color flow.   Abd: fundus firm below umbilicus  : Laceration visualized without bleeding, small trickle of dark red blood, light lochia on pad      - Suspect normal postpartum bleeding at this time, no evidence of retained products, atony, or bleeding from laceration  - Vital signs wnl  - Encouraged patient to monitor for further bleeding an alert RN    Molly Castillo MD  PGY-4, Obstetrics and Gynecology    "

## 2024-11-30 NOTE — SIGNIFICANT EVENT
"Labor Progress Note    Subjective:   Patient just got epidural. Feeling rectal pressure    Objective:  Vitals:  /70   Pulse 79   Temp 36.4 °C (97.5 °F) (Temporal)   Resp 19   Ht 1.702 m (5' 7\")   Wt 138 kg (305 lb 5.4 oz)   LMP 03/12/2024 (Exact Date)   SpO2 99%   BMI 47.82 kg/m²   FHTs: A: baseline 133 moderate variability, +accels, -decels  B: baseline 129 moderate variability, +accels, -decels  [place]: ctx q3-4min  SVE: 4/80/-1    A/P:    Latent labor  - Titrate pitocin per protocol, currently at 4  - s/p AROM  - Epidural infusing  - CEFM; Cat 1 currently x2  - GBS neg    Elizabeth Quevedo MD PGY-2    "

## 2024-12-01 VITALS
RESPIRATION RATE: 20 BRPM | OXYGEN SATURATION: 96 % | DIASTOLIC BLOOD PRESSURE: 84 MMHG | HEIGHT: 67 IN | WEIGHT: 293 LBS | SYSTOLIC BLOOD PRESSURE: 130 MMHG | TEMPERATURE: 98.4 F | HEART RATE: 84 BPM | BODY MASS INDEX: 45.99 KG/M2

## 2024-12-01 LAB
ERYTHROCYTE [DISTWIDTH] IN BLOOD BY AUTOMATED COUNT: 14.2 % (ref 11.5–14.5)
HCT VFR BLD AUTO: 23.5 % (ref 36–46)
HGB BLD-MCNC: 7.4 G/DL (ref 12–16)
MCH RBC QN AUTO: 26.5 PG (ref 26–34)
MCHC RBC AUTO-ENTMCNC: 31.5 G/DL (ref 32–36)
MCV RBC AUTO: 84 FL (ref 80–100)
NRBC BLD-RTO: 0 /100 WBCS (ref 0–0)
PLATELET # BLD AUTO: 250 X10*3/UL (ref 150–450)
RBC # BLD AUTO: 2.79 X10*6/UL (ref 4–5.2)
WBC # BLD AUTO: 16.7 X10*3/UL (ref 4.4–11.3)

## 2024-12-01 PROCEDURE — 1210000001 HC SEMI-PRIVATE ROOM DAILY

## 2024-12-01 PROCEDURE — 2500000001 HC RX 250 WO HCPCS SELF ADMINISTERED DRUGS (ALT 637 FOR MEDICARE OP): Performed by: FAMILY MEDICINE

## 2024-12-01 PROCEDURE — 2500000004 HC RX 250 GENERAL PHARMACY W/ HCPCS (ALT 636 FOR OP/ED): Performed by: FAMILY MEDICINE

## 2024-12-01 PROCEDURE — 36415 COLL VENOUS BLD VENIPUNCTURE: CPT | Performed by: FAMILY MEDICINE

## 2024-12-01 PROCEDURE — 85027 COMPLETE CBC AUTOMATED: CPT | Performed by: FAMILY MEDICINE

## 2024-12-01 PROCEDURE — 2500000001 HC RX 250 WO HCPCS SELF ADMINISTERED DRUGS (ALT 637 FOR MEDICARE OP)

## 2024-12-01 ASSESSMENT — PAIN DESCRIPTION - LOCATION
LOCATION: OTHER (COMMENT)
LOCATION: ABDOMEN
LOCATION: PERINEUM

## 2024-12-01 ASSESSMENT — PAIN SCALES - GENERAL
PAINLEVEL_OUTOF10: 4
PAINLEVEL_OUTOF10: 4
PAIN_LEVEL: 0
PAINLEVEL_OUTOF10: 0 - NO PAIN
PAINLEVEL_OUTOF10: 7
PAINLEVEL_OUTOF10: 4
PAINLEVEL_OUTOF10: 4

## 2024-12-01 NOTE — LACTATION NOTE
Lactation Consultant Note  Lactation Consultation  Reason for Consult: Initial assessment, Multiple gestation, Difficult latch, Breast/nipple pain  Consultant Name: Alma Reynolds, RN, IBCLC    Maternal Information  Has mother  before?: No    Maternal Assessment  Breast Assessment: Large, Pendulous, Symmetrical, Other (Comment), Soft, Compressible (expressible)  Nipple Assessment: Inverted centrally, Flat, Other (Comment), Red/inflamed, Sore, Blistered (left nipple everts with stimulaiton, cracked)  Alterations in Nipple Condition: Stage II - abrasions, shallow crack/fissure, stripe  [place] Assessment: Normal    Infant Assessment  Infant Behavior: Sleepy, Gaggy/spitty  Infant Assessment: Palate - high/arch/bubble/normal, Tongue humped/bunched/retracted/elevated, Other (Comment) (possible tight frenulum on girl, boy loses suction)    Feeding Assessment  Nutrition Source: Formula (per mother’s request), Breastmilk  Feeding Method: Nursing at the breast, Paced bottle  Feeding Position: C - hold, Football/seated, Skin to skin, Nipple to nose, Mother needs assistance with latch/positioning  Suck/Feeding: Unsustained  Latch Assessment: Too sleepy, Maximum assistance is needed    LATCH TOOL  Latch: Repeated attempts, hold nipple in mouth, stimulate to suck  Audible Swallowing: None  Type of Nipple: Inverted  Comfort (Breast/Nipple): Engorged, cracked, bleeding, large blisters or bruises  Hold (Positioning): Full assist, staff holds infant at breast  LATCH Score: 1    Breast Pump  Pump: Double breast pumping, Hospital grade electric pump, Massage, Hand expression  Frequency: Other (comment) (encouraged her to pump every 3 hours if not latcing the baby to the breast -)  Duration: Initiate phase  Breast Shield Size and Type: 21 mm, Other (comment) (nipples measured to be 20 MM)    Other OB Lactation Tools  Lactation Tools: Flanges, Comfort gels    Patient Follow-up  Inpatient Lactation Follow-up Needed :  Yes  Outpatient Lactation Follow-up: Recommended    Other OB Lactation Documentation  Maternal Risk Factors: Extreme tiredness, fatigue or stress, Flat or inverted nipple tissue, Other (comment), Hypertension (TWINS)  Infant Risk Factors: Poor or painful latch / restricted feedings, Early term birth 37-39 weeks, Low birth weight <2500 g, Other (comment) (girl 2490 Grams)    Recommendations/Summary  IN to assist mom with latching/ pumping TWINS    Night shift RN reported both nipples invert and are damaged- split/ bleeding and mom has a blister on one of them.     Mom stated her plan is to breast feed both of the babies but, has had pain with the latch but, was open to assistance to attempt to latch.     Stared with baby girl; then went to boy. Both babies needed diaper changes-    reviewed positioning of babies in football hold on the left breast. (Avoided the right d/t pain on the nipple per mom),  use of pillow support, hand placement on babies and on breast, expression of colostrum to the nipple prior to latching (mom very expressible), and  latching technique,     Baby girl- multiple attempts made but, baby not interested in feeding at this time. Large emesis.   Baby boy- showed feeding cues and did latch to the left breast in football hold but, mom stated the latch was too painful to allow the baby to stay at the breast. Detached the baby and noted nipple to be round and everted. But, mom stated she can't tolerate the pain.     Offered to set her up to pump and mom was agreeable.     Oriented mom to pump set up- use- and cleaning of pump parts.   Measured her nipples to be 20 MM- advised to use 21 MM flanges.     Reviewed the difference between latching and pumping, the benefit of skin to skin, the benefits of breast massage prior to pumping, expectations of volume with pumping, milk storage and cleaning of pump parts.     PI-123 and [facility] pump cleaning guide given.     Started her pumping but, mom stated the  pump was too painful as well at this time d/t damaged nipples.   Gel pads given and explained usage.   Mom to take the next 24 hours and allow the nipples to heal and then attempt to pump again to see if she can tolerate the pumping.     Mom needs a breast pump for at home.   Will fax her insurance information to [employer].       Report to bedside RN     Encouraged her to utilize the outpatient lactation resources, if needed.   Contact information given.   [phone] [place] or [phone] [place]

## 2024-12-01 NOTE — PROGRESS NOTES
Postpartum Progress Note    Assessment/Plan     Austin Argueta is a 29 y.o., , who initially presented for IOL in s/o new FGR in di/di twins. She had a  delivery on  at 37w4d and is now PPD1.      , c/b perineal and periurethral lacerations  - 3b and sulcal lacteraions repaired, ERAD clinic referral on discharge  - continue routine care  - pain well controlled on ERAS protocol  - DVT Score: 5 SCDs and enoxaparin prophylactic dose daily while inpatient  - Pt's blood type is O POS. Rhogam is not indicated.    Acute on chronic anemia in s/o acute blood loss  - HMG 9.8 ->  > PPD# 7.4  - for IV Fe x3  - asx  - for PO Fe at discharge  - continue to monitor for s/sx of anemia    gHTN  - dx by mild range BP readings >4 hrs apart  - HELLP labs neg on admission  - asx  - no meds  - intermittent mild ranges, consider starting med if persistently mild range  - discussed 3 day stay for BP, pt verbalizes understanding   - BP cuff for home at discharge, to monitor BID    Contraception  declines bridge method and discussed pregnancy spacing of at least one year, abstaining from intercourse for 6wks, and the ability to become pregnant in the absence of regular menses. Pt verbalized understanding    Maternal Well-Being  - emotional support provided  - bonding with infants  -  feeding: breastfeeding/pumping encouraged; lactation consult prn     Dispo  - anticipate d/c on PPD#3 if meeting all post-op and postpartum milestones    - The signs and symptoms of PEC were reviewed with the patient, including unrelenting headache, vision changes/blurred vision, and RUQ pain  - BP cuff for home for checking BP twice a day  - Pt instructed to call primary OB if SBP > 160 or DBP > 110 or if development of PEC symptoms     - On discharge, follow up in:       - ERAD clinic       - 2-5 days for BP check       - 4-6 weeks for post-partum visit       Principal Problem:    37 weeks gestation of pregnancy  ([facility])  Active Problems:    Dichorionic diamniotic twin pregnancy in third trimester ([facility])    Type 3b perineal laceration during delivery ([facility])        Subjective   Her pain is moderately controlled with current medications  She is passing flatus  She is not yet ambulating independently  She is tolerating a Adult diet Regular  She is voiding spontaneously  She reports no breast or nursing problems  She denies emotional concerns today     Denies CP, SOB, RUQ pain, HA, scotoma, vision changes  Denies lightheadedness, dizziness       Objective   Last Vitals:  Temp Pulse Resp BP MAP Pulse Ox   36.9 °C (98.4 °F) 82 18 121/75   96 %       Physical Exam:  General: well appearing, obese, postpartum  Obstetric: fundus firm below umbilicus, lochia light  Skin: Warm, dry; no rashes/lesions/erythema; repaired perineal lacerations without oozing  Neuro: A/Ox3, no gross motor deficit   GI: no distension, appropriately tender, soft  Respiratory: Even and unlabored on RA  Cardiovascular: Trace BLE edema; No erythema, warmth  Psych: appropriate mood and affect      Lab Data:  Lab Results   Component Value Date    WBC 16.7 (H) 12/01/2024    HGB 7.4 (L) 12/01/2024    HCT 23.5 (L) 12/01/2024     12/01/2024

## 2024-12-01 NOTE — CARE PLAN
The patient's goals for the shift include To have adequate pain control    The clinical goals for the shift include To bond with  and have a positive PP recovery    VSS currently in stable condition, bonding well with twin babies.

## 2024-12-01 NOTE — ANESTHESIA POSTPROCEDURE EVALUATION
Patient: Austin Argueta    Procedure Summary       Date: 24 Room / Location:     Anesthesia Start: 0100 Anesthesia Stop: 1007    Procedure: Labor Analgesia Diagnosis:     Scheduled Providers:  Responsible Provider: Mauricio Peters DO    Anesthesia Type: epidural ASA Status: 3            Anesthesia Type: epidural        2024     3:05 PM 2024     3:20 PM 2024     4:31 PM 2024     7:54 PM 2024    12:02 AM 2024     3:59 AM 2024     8:13 AM   Vitals   Systolic 145   124 120 114 123   Diastolic 87   84 81 79 84   Heart Rate 93  92 91 82 77 87   Temp  37.9 °C (100.2 °F) 37.7 °C (99.9 °F) 37.2 °C (99 °F) 36.7 °C (98.1 °F) 36.9 °C (98.4 °F) 36.9 °C (98.4 °F)   Resp 20  18 18 20 18 16       Austin Argueta is a 29 y.o., , who had a      Ellie, aONEVelEyshaGIRL [[id number]]   Vaginal, Spontaneous      Ellie, bTWOVelEyshaBoy [[id number]]   Vaginal, Spontaneous delivery on      Ellie, aONEVelEyshaGIRL [[id number]]   2024      Ellie, bTWOVelEyshaBoy [[id number]]   2024 at 37w4d and is now POD1.    She had Neuraxial Anesthesia without immediate complications noted.       Pain well controlled    Vitals:    24 0813   BP: 123/84   Pulse: 87   Resp: 16   Temp: 36.9 °C (98.4 °F)   SpO2: 97%       Neuraxial site assessed. No visible redness or swelling or drainage. Patient able to ambulate and move all extremities without difficulty. Able to void. No complaints of nausea/vomiting. Tolerating PO intake well. No s/sx of PDPH.     Anesthesia will sign off     Alli Vieyra MD       Anesthesia Post Evaluation    Patient location during evaluation: floor  Patient participation: complete - patient participated  Level of consciousness: awake and alert  Pain score: 0  Pain management: adequate  Airway patency: patent  Cardiovascular status: stable  Respiratory status: spontaneous ventilation  Hydration status: acceptable  Postoperative Nausea and Vomiting: none        No  notable events documented.

## 2024-12-02 LAB
BLOOD EXPIRATION DATE: NORMAL
DISPENSE STATUS: NORMAL
PRODUCT BLOOD TYPE: 5100
PRODUCT CODE: NORMAL
UNIT ABO: NORMAL
UNIT NUMBER: NORMAL
UNIT RH: NORMAL
UNIT VOLUME: 320
XM INTEP: NORMAL

## 2024-12-02 PROCEDURE — 2500000001 HC RX 250 WO HCPCS SELF ADMINISTERED DRUGS (ALT 637 FOR MEDICARE OP): Performed by: FAMILY MEDICINE

## 2024-12-02 PROCEDURE — 1210000001 HC SEMI-PRIVATE ROOM DAILY

## 2024-12-02 PROCEDURE — 2500000004 HC RX 250 GENERAL PHARMACY W/ HCPCS (ALT 636 FOR OP/ED): Performed by: FAMILY MEDICINE

## 2024-12-02 PROCEDURE — 2500000005 HC RX 250 GENERAL PHARMACY W/O HCPCS: Performed by: FAMILY MEDICINE

## 2024-12-02 PROCEDURE — 2500000001 HC RX 250 WO HCPCS SELF ADMINISTERED DRUGS (ALT 637 FOR MEDICARE OP)

## 2024-12-02 RX ORDER — DOCUSATE SODIUM 100 MG/1
100 CAPSULE, LIQUID FILLED ORAL 2 TIMES DAILY
Status: DISCONTINUED | OUTPATIENT
Start: 2024-12-02 | End: 2024-12-03 | Stop reason: HOSPADM

## 2024-12-02 ASSESSMENT — PAIN SCALES - GENERAL
PAINLEVEL_OUTOF10: 5 - MODERATE PAIN
PAINLEVEL_OUTOF10: 4
PAINLEVEL_OUTOF10: 5 - MODERATE PAIN

## 2024-12-02 ASSESSMENT — PAIN DESCRIPTION - LOCATION
LOCATION: PERINEUM
LOCATION: PERINEUM

## 2024-12-02 NOTE — LACTATION NOTE
Lactation Consultant Note  Lactation Consultation  Reason for Consult: Follow-up assessment, Multiple gestation, Breast/nipple pain, Difficult latch, Other (Comment) (too painful yesterday to latch or pump)  Consultant Name: Alma Reynolds RN, IBCLC    Maternal Information       Maternal Assessment  Breast Assessment: Large, Pendulous, Soft, Compressible, Other (Comment) (expressible)  Nipple Assessment: Inverted centrally, Flat, Red/inflamed, Sore  Alterations in Nipple Condition: Stage II - abrasions, shallow crack/fissure, stripe  [place] Assessment: Normal    Infant Assessment  Infant Behavior: Deep sleep    Feeding Assessment  Nutrition Source: Formula (per mother’s request), Breastmilk  Feeding Method: Paced bottle  Unable to assess infant feeding at this time: Other (Comment) (mother has sore/ damaged nipples and is choosing to not latch to the breast)    LATCH TOOL       Breast Pump  Pump: Hospital grade electric pump, Single breast pumping, Hand expression, Massage (mom has large pendulous breasts- I advised her that she can pump one breast at a time if this is easier for her.)  Frequency: Other (comment) (Encouraged her to pump every 3 hours for 20 minutes on both breasts)  Breast Shield Size and Type: 21 mm  Units of Volume: mL per session    Other OB Lactation Tools       Patient Follow-up  Inpatient Lactation Follow-up Needed : Yes  Outpatient Lactation Follow-up: Recommended    Other OB Lactation Documentation  Maternal Risk Factors: Extreme tiredness, fatigue or stress, Flat or inverted nipple tissue, Hypertension, Other (comment) (TWINS)  Infant Risk Factors: Poor or painful latch / restricted feedings, Early term birth 37-39 weeks, Low birth weight <2500 g    Recommendations/Summary  Mom has been formula feeding the TWINS since yesterday.   She was in too much pain with latching or pumping from improper latching that occurred after delivery that damaged her nipples.   Yesterday we attempted  to latch and attempted to pump but, mom was too uncomfortable.   Therefore she opted to feed formula to both twins via bottle nipple and allow her nipples to heal. I gave her gel pads to help facilitate healing of her nipples but, she informed me this AM that she did not use them at all.   Reviewed gel pad use again and encouraged her to use to help heal her nipples.     She was open to attempting to pump- I set her up with one- sided pumping d/t she has large breasts and pumping one at a time may be easier for  her.   She is pumping at time time and already has ML's out of the left breast.   I encouraged her to pump until the initiate phase ends and then switch to the other breast.   At the TWINS next feeding she can divide the pumped breast milk between the TWINS and then follow up with formula, if needed.     If the pumping isn't too painful, I discussed that we can try to latch the babies back to the breast, if she wants to, or eventually she can do this as well.   No definitive answer in regards to attempting to latch the babies back to the breast but,she did say she would pump/.       Encouraged her to pump every 3 hours (8-12 times in a 24 hour period) for 120 minutes on both breasts and to give the TWINS any pumped breast milk prior to any use of formula supplement.      Denies any questions at this time.   Bedside RN made aware.

## 2024-12-02 NOTE — SIGNIFICANT EVENT
Patient meets criteria for home monitoring of blood pressure post discharge.  Reason:  current gestational hypertension. Met with patient to assess for availability of home BP monitor.   Patient does not have access to BP monitor at home.  Pt agreed to order home BP monitor from [employer].   X- Large BP monitor delivered to room. Patient educated on how to use BP monitor.. Patient educated on importance of continuing to monitor BP at home, recording BP on home monitoring log and s/sx of when to call her provider.  Pt verbalized understanding the above information.

## 2024-12-02 NOTE — PROGRESS NOTES
Postpartum Progress Note    Assessment/Plan     Austin Hector Argueta is a 29 y.o., , who initially presented for IOL in s/o new FGR in di/di twins. She had a  delivery on  at 37w4d and is now PPD2.      , c/b perineal and periurethral lacerations  - 3b and sulcal lacteraions repaired, ERAD clinic referral on discharge  - continue routine care  - pain well controlled on ERAS protocol, s/p oxycodone x1 dose PPD0  - Recommend sitz bath  - Scheduled bowel regimen: miralax and colace BID  - DVT Score: 5 SCDs and enoxaparin prophylactic dose daily while inpatient  - Pt's blood type is O POS. Rhogam is not indicated.    gHTN  - dx by mild range BP readings >4 hrs apart  - HELLP labs neg on admission  - asx  - BP WNL on no meds  - discussed 3 day stay for BP, pt verbalizes understanding   - BP cuff for home at discharge, to monitor BID    Acute on chronic anemia in s/o acute blood loss  - HMG 9.8 ->  > PPD# 7.4  - for IV Fe x3  - asx  - for PO Fe at discharge  - continue to monitor for s/sx of anemia    Contraception  declines bridge method and discussed pregnancy spacing of at least one year, abstaining from intercourse for 6wks, and the ability to become pregnant in the absence of regular menses. Pt verbalized understanding    Maternal Well-Being  - emotional support provided  - bonding with infants  -  feeding: breastfeeding/pumping encouraged; lactation consult prn     Dispo  - anticipate d/c on PPD#3 if meeting all post-op and postpartum milestones    - The signs and symptoms of PEC were reviewed with the patient, including unrelenting headache, vision changes/blurred vision, and RUQ pain  - BP cuff for home for checking BP twice a day  - Pt instructed to call primary OB if SBP > 160 or DBP > 110 or if development of PEC symptoms     - On discharge, follow up in:       - ERAD clinic       - 2-5 days for BP check       - 4-6 weeks for post-partum visit       Principal Problem:    37 weeks  gestation of pregnancy ([facility])  Active Problems:    Dichorionic diamniotic twin pregnancy in third trimester ([facility])    Type 3b perineal laceration during delivery ([facility])        Subjective   Her pain is moderately controlled with current medications  She is passing flatus  She is ambulating independently  She is tolerating a Adult diet Regular  She is voiding spontaneously  She reports no breast or nursing problems  She denies emotional concerns today     Denies CP, SOB, RUQ pain, HA, scotoma, vision changes  Denies lightheadedness, dizziness       Objective   Last Vitals:  Temp Pulse Resp BP MAP Pulse Ox   37.3 °C (99.1 °F) 91 16 125/85   95 %       Physical Exam:  General: well appearing, obese, postpartum  Obstetric: fundus firm below umbilicus, lochia light  Skin: Warm, dry; no rashes/lesions/erythema; repaired perineal lacerations without oozing  Neuro: A/Ox3, no gross motor deficit   GI: no distension, appropriately tender, soft  Respiratory: Even and unlabored on RA  Cardiovascular: Trace BLE edema; No erythema, warmth  Psych: appropriate mood and affect      Lab Data:  Lab Results   Component Value Date    WBC 16.7 (H) 12/01/2024    HGB 7.4 (L) 12/01/2024    HCT 23.5 (L) 12/01/2024     12/01/2024

## 2024-12-03 ENCOUNTER — PHARMACY VISIT (OUTPATIENT)
Dept: PHARMACY | Facility: CLINIC | Age: 29
End: 2024-12-03
Payer: MEDICAID

## 2024-12-03 ENCOUNTER — TELEPHONE (OUTPATIENT)
Dept: OBSTETRICS AND GYNECOLOGY | Facility: HOSPITAL | Age: 29
End: 2024-12-03
Payer: COMMERCIAL

## 2024-12-03 VITALS
WEIGHT: 293 LBS | BODY MASS INDEX: 45.99 KG/M2 | TEMPERATURE: 99 F | OXYGEN SATURATION: 95 % | HEIGHT: 67 IN | SYSTOLIC BLOOD PRESSURE: 117 MMHG | HEART RATE: 84 BPM | RESPIRATION RATE: 18 BRPM | DIASTOLIC BLOOD PRESSURE: 82 MMHG

## 2024-12-03 PROCEDURE — 2500000001 HC RX 250 WO HCPCS SELF ADMINISTERED DRUGS (ALT 637 FOR MEDICARE OP): Performed by: FAMILY MEDICINE

## 2024-12-03 PROCEDURE — RXMED WILLOW AMBULATORY MEDICATION CHARGE

## 2024-12-03 PROCEDURE — 99238 HOSP IP/OBS DSCHRG MGMT 30/<: CPT

## 2024-12-03 PROCEDURE — 2500000004 HC RX 250 GENERAL PHARMACY W/ HCPCS (ALT 636 FOR OP/ED): Performed by: FAMILY MEDICINE

## 2024-12-03 PROCEDURE — 2500000001 HC RX 250 WO HCPCS SELF ADMINISTERED DRUGS (ALT 637 FOR MEDICARE OP)

## 2024-12-03 RX ORDER — FERROUS SULFATE 325(65) MG
325 TABLET ORAL
Qty: 30 TABLET | Refills: 2 | Status: SHIPPED | OUTPATIENT
Start: 2024-12-03 | End: 2025-03-03

## 2024-12-03 RX ORDER — ACETAMINOPHEN 325 MG/1
975 TABLET ORAL EVERY 6 HOURS
Qty: 360 TABLET | Refills: 0 | Status: SHIPPED | OUTPATIENT
Start: 2024-12-03 | End: 2025-01-02

## 2024-12-03 RX ORDER — POLYETHYLENE GLYCOL 3350 17 G/17G
17 POWDER, FOR SOLUTION ORAL DAILY
Qty: 510 G | Refills: 0 | Status: SHIPPED | OUTPATIENT
Start: 2024-12-03 | End: 2025-01-02

## 2024-12-03 RX ORDER — IBUPROFEN 600 MG/1
600 TABLET ORAL EVERY 6 HOURS
Qty: 120 TABLET | Refills: 0 | Status: SHIPPED | OUTPATIENT
Start: 2024-12-03 | End: 2025-01-02

## 2024-12-03 ASSESSMENT — PAIN SCALES - GENERAL
PAINLEVEL_OUTOF10: 4
PAINLEVEL_OUTOF10: 4

## 2024-12-03 ASSESSMENT — PAIN DESCRIPTION - LOCATION: LOCATION: PERINEUM

## 2024-12-03 NOTE — TELEPHONE ENCOUNTER
----- Message from Oksana Key sent at 12/3/2024  9:10 AM EST -----  Hi, I am discharging this patient from our service today.     Can you please make sure that she is scheduled for a BP check with in 2-5 days from today? She is s/p a vaginal delivery of Duyen twins, complicated by gestational hypertension.    Appreciate your help!    Oksana Key, KURTIS-CNP, CLC

## 2024-12-03 NOTE — DISCHARGE SUMMARY
Discharge Summary    Admission Date: 2024  Discharge Date: 24  Discharge Diagnosis: 37 weeks gestation of pregnancy ([facility])       Patient Active Problem List   Diagnosis    36 weeks gestation of pregnancy ([facility])    Short cervical length during pregnancy in third trimester    Obesity in pregnancy ([facility])    Dichorionic diamniotic twin pregnancy in third trimester ([facility])    Anemia affecting pregnancy in third trimester ([facility])    Gestational hypertension, third trimester ([facility])    37 weeks gestation of pregnancy ([facility])    Type 3b perineal laceration during delivery ([facility])    Vaginal delivery ([facility])         Hospital Course  Austin Argueta is a 29 y.o.,   who initially presented for IOL in s/o new FGR in di/di twins. She had a  delivery on  at 37w4d and is now PPD 3.    Admission Date: 2024    Delivery Date:     Delivery type: Spontaneous Vaginal Delivery    GA at delivery: 37w4d    Outcome: Living    Anesthesia during delivery: Epidural    Intrapartum complications: Uterine atony    Feeding method: Breast and Bottle feeding    Contraception: Defers contraception to primary OB/PP visit. We discussed pregnancy spacing of at least one year, abstaining from intercourse for 6wks, and the ability to become pregnant in the absence of regular menses. Patient verbalized understanding. Encouraged to continue PNV.    Rhogam: The patient's blood type is  O POS. The baby's blood type is      Ellie, aONEVelEyshaGIRL [[id number]]   B POS      Ellie, bTWOVelEyshaBoy [[id number]]   B POS.  Rhogam is not indicated.     Now postpartum day: 3.    Hospital course n/f:    , c/b perineal and periurethral lacerations  - 3b and sulcal lacerations repaired, ERAD clinic referral on discharge  - continue routine care  - pain well controlled on ERAS protocol, s/p oxycodone x1 dose PPD0  - Recommend sitz bath  - Encourage to continue bowel regimen for at least 2 weeks: miralax  and colace BID     gHTN  - dx by mild range BP readings >4 hrs apart  - HELLP labs neg on admission  - asx  - Normotensive on no medications  -To monitor blood pressures at home twice daily, log, and BP cuff given  - Reviewed signs of elevated BP, appropriate BP parameters and how to monitor BP at home   - Outpatient follow-up in 3 days for BP check, message sent to [employer] clinical pool for appt scheduling assistance    Acute on chronic anemia in s/o acute blood loss  - HMG 9.8 ->  > PPD# 7.4  - for IV Fe x3  - asx  - for PO Fe at discharge  - continue to monitor for s/sx of anemia      Visit Vitals  /82   Pulse 84   Temp 37.2 °C (99 °F) (Temporal)   Resp 18        Pertinent Subjective and Physical Exam At Time of Discharge  Patient seen at bedside, she is doing well with no acute events overnight. Her postpartum course was otherwise uneventful, and she appropriately met postpartum milestones. She remained hemodynamically stable, afebrile and normotensive. The physical examination was unremarkable, her abdomen was appropriately tender. Her lochia was stable and decreasing. She was ambulating without difficulty, voiding without difficulty and passing flatus. She was breast feeding and formula feeding without difficulty. Denies headaches, chest pain, shortness of breath, scotoma, or right upper quadrant pain.      PHYSICAL EXAM:  Constitutional: examination reveals a well developed, well nourished, female, in no acute distress. She is alert, pleasant and cooperative.  Cardiac: warm and well perfused, S1, S2, RRR  Respiratory:  Even and unlabored breathing in room air, clear to auscultation bilaterally. No crackles or wheezes.  Fundus: Firm and below umbilicus  Breast: No masses, or nipple discharge  Derm: Skin color, texture, turgor normal. No rashes, or lesions.    Abdominal: soft, appropriately-tender, non-distended, active bowel sounds x4, no masses, no organomegaly  Extremities: Symmetrical, no  redness or tenderness in the calves or thighs, Trace BLE edema, no discoloration  Neurological: PERRLA. Alert, oriented, normal speech, no focal findings or movement disorder noted.  Psychological: Appropriate mood and affect. Awake and alert; oriented to person, place, and time.   Skin: Lacerations intact, free of hematoma, erythema, or abnormal discharge.      Discharge Meds     Your medication list        START taking these medications        Instructions Last Dose Given Next Dose Due   acetaminophen 325 mg tablet  Commonly known as: Tylenol      Take 3 tablets (975 mg) by mouth every 6 hours.       ibuprofen 600 mg tablet      Take 1 tablet (600 mg) by mouth every 6 hours.       prenatal vitamin (iron-folic) 27 mg iron-800 mcg folic acid tablet      Take 1 tablet by mouth once daily.              CONTINUE taking these medications        Instructions Last Dose Given Next Dose Due   blood pressure monitor kit  Commonly known as: Blood Pressure Kit      1 each 2 times a day.       ferrous sulfate (325 mg ferrous sulfate) tablet  Commonly known as: FeroSuL      Take 1 tablet (325 mg) by mouth once daily with breakfast.       PNV-DHA 27 mg iron-1 mg -300 mg capsule  Generic drug: multivit 47-iron-folate 1-dha           polyethylene glycol 17 gram/dose powder  Commonly known as: Glycolax, Miralax      Mix 17 g of powder and drink once daily. Dissolve into 8 oz of liquid                 Where to Get Your Medications        These medications were sent to [facility]  [address] [place] [id number]      Hours: 8:30 AM to 5 PM Mon-Fri Phone: [phone]   acetaminophen 325 mg tablet  ferrous sulfate (325 mg ferrous sulfate) tablet  ibuprofen 600 mg tablet  polyethylene glycol 17 gram/dose powder  prenatal vitamin (iron-folic) 27 mg iron-800 mcg folic acid tablet          Test Results Pending At Discharge  Pending Labs       Order Current Status    Surgical Pathology Exam - PLACENTA In process             Dispo:  The patient appropriate for discharge home, agrees with plan.     -The signs and symptoms of PEC were reviewed with the patient, including unrelenting headache, vision changes/blurred vision, and RUQ pain  -BP cuff for home for checking BP twice a day  -Patient instructed to call primary OB if SBP > 160 or DBP > 110 or if development of PEC symptoms     Outpatient Follow-Up  No future appointments.   order placed to schedule 2-5 days for BP check and 4-6 weeks for routine postpartum visit  with Dr. De La Rosa and [place] [id number] nursing staff,  order placed for ERAD follow up with in 2 weeks.    I spent 30 minutes in the professional and overall care of this patient.      KURTIS Moreno-CNP, CLC   12/03/24 8:47 AM  Vocera/secure chat

## 2024-12-03 NOTE — TELEPHONE ENCOUNTER
RN called patient to set up bp check  Verified name and   Nurse visit scheduled for  at 1:00 at [facility] for a blood pressure check postpartum.     Agnes MONZONN, RN

## 2024-12-03 NOTE — CARE PLAN
Problem: Postpartum  Goal: Experiences normal postpartum course  Outcome: Adequate for Discharge  Goal: Appropriate maternal -  bonding  Outcome: Adequate for Discharge  Goal: Establish and maintain infant feeding pattern for adequate nutrition  Outcome: Adequate for Discharge  Goal: Incisions, wounds, or drain sites healing without S/S of infection  Outcome: Adequate for Discharge  Goal: No s/sx infection  Outcome: Adequate for Discharge  Goal: No s/sx of hemorrhage  Outcome: Adequate for Discharge  Goal: Minimal s/sx of HDP and BP<160/110  Outcome: Adequate for Discharge     Problem: Pain - Adult  Goal: Verbalizes/displays adequate comfort level or baseline comfort level  Outcome: Adequate for Discharge     Problem: Safety - Adult  Goal: Free from fall injury  Outcome: Adequate for Discharge     Problem: Discharge Planning  Goal: Discharge to home or other facility with appropriate resources  Outcome: Adequate for Discharge   The patient's goals for the shift include clear for d/c    The clinical goals for the shift include light to moderate lochia      Vital signs stable throughout the shift, lochia has been WNL, patient is without s/s of HDP. Patient is bonding well with her newborns!

## 2024-12-04 NOTE — ANESTHESIA PROCEDURE NOTES
Peripheral IV  Date/Time: 11/30/2024 11:10 AM      Placement  Needle size: 18 G  Laterality: left  Location: forearm

## 2024-12-06 ENCOUNTER — HOSPITAL ENCOUNTER (OUTPATIENT)
Facility: HOSPITAL | Age: 29
Discharge: HOME | End: 2024-12-06
Attending: STUDENT IN AN ORGANIZED HEALTH CARE EDUCATION/TRAINING PROGRAM | Admitting: STUDENT IN AN ORGANIZED HEALTH CARE EDUCATION/TRAINING PROGRAM
Payer: COMMERCIAL

## 2024-12-06 ENCOUNTER — CLINICAL SUPPORT (OUTPATIENT)
Dept: OBSTETRICS AND GYNECOLOGY | Facility: HOSPITAL | Age: 29
End: 2024-12-06
Payer: COMMERCIAL

## 2024-12-06 VITALS
SYSTOLIC BLOOD PRESSURE: 127 MMHG | BODY MASS INDEX: 44.19 KG/M2 | WEIGHT: 281.53 LBS | RESPIRATION RATE: 18 BRPM | DIASTOLIC BLOOD PRESSURE: 70 MMHG | OXYGEN SATURATION: 96 % | HEART RATE: 81 BPM | HEIGHT: 67 IN | TEMPERATURE: 97.9 F

## 2024-12-06 VITALS — DIASTOLIC BLOOD PRESSURE: 93 MMHG | SYSTOLIC BLOOD PRESSURE: 152 MMHG

## 2024-12-06 DIAGNOSIS — O13.3 GESTATIONAL HYPERTENSION, THIRD TRIMESTER (HHS-HCC): Primary | ICD-10-CM

## 2024-12-06 DIAGNOSIS — O10.019 PRE-EXISTING ESSENTIAL HYPERTENSION DURING PREGNANCY, ANTEPARTUM (HHS-HCC): ICD-10-CM

## 2024-12-06 LAB
ALBUMIN SERPL BCP-MCNC: 3.5 G/DL (ref 3.4–5)
ALP SERPL-CCNC: 90 U/L (ref 33–110)
ALT SERPL W P-5'-P-CCNC: 24 U/L (ref 7–45)
ANION GAP SERPL CALC-SCNC: 13 MMOL/L (ref 10–20)
AST SERPL W P-5'-P-CCNC: 29 U/L (ref 9–39)
BILIRUB SERPL-MCNC: 0.4 MG/DL (ref 0–1.2)
BUN SERPL-MCNC: 11 MG/DL (ref 6–23)
CALCIUM SERPL-MCNC: 9.1 MG/DL (ref 8.6–10.6)
CHLORIDE SERPL-SCNC: 104 MMOL/L (ref 98–107)
CO2 SERPL-SCNC: 24 MMOL/L (ref 21–32)
CREAT SERPL-MCNC: 0.88 MG/DL (ref 0.5–1.05)
EGFRCR SERPLBLD CKD-EPI 2021: >90 ML/MIN/1.73M*2
ERYTHROCYTE [DISTWIDTH] IN BLOOD BY AUTOMATED COUNT: 16.1 % (ref 11.5–14.5)
GLUCOSE SERPL-MCNC: 81 MG/DL (ref 74–99)
HCT VFR BLD AUTO: 26.5 % (ref 36–46)
HGB BLD-MCNC: 8.3 G/DL (ref 12–16)
MCH RBC QN AUTO: 27.5 PG (ref 26–34)
MCHC RBC AUTO-ENTMCNC: 31.3 G/DL (ref 32–36)
MCV RBC AUTO: 88 FL (ref 80–100)
NRBC BLD-RTO: 0.5 /100 WBCS (ref 0–0)
PLATELET # BLD AUTO: 401 X10*3/UL (ref 150–450)
POTASSIUM SERPL-SCNC: 3.9 MMOL/L (ref 3.5–5.3)
PROT SERPL-MCNC: 6.4 G/DL (ref 6.4–8.2)
RBC # BLD AUTO: 3.02 X10*6/UL (ref 4–5.2)
SODIUM SERPL-SCNC: 137 MMOL/L (ref 136–145)
WBC # BLD AUTO: 9.9 X10*3/UL (ref 4.4–11.3)

## 2024-12-06 PROCEDURE — 80053 COMPREHEN METABOLIC PANEL: CPT | Performed by: STUDENT IN AN ORGANIZED HEALTH CARE EDUCATION/TRAINING PROGRAM

## 2024-12-06 PROCEDURE — 85027 COMPLETE CBC AUTOMATED: CPT | Performed by: STUDENT IN AN ORGANIZED HEALTH CARE EDUCATION/TRAINING PROGRAM

## 2024-12-06 PROCEDURE — 99213 OFFICE O/P EST LOW 20 MIN: CPT

## 2024-12-06 PROCEDURE — 2500000001 HC RX 250 WO HCPCS SELF ADMINISTERED DRUGS (ALT 637 FOR MEDICARE OP): Mod: SE | Performed by: STUDENT IN AN ORGANIZED HEALTH CARE EDUCATION/TRAINING PROGRAM

## 2024-12-06 PROCEDURE — 99213 OFFICE O/P EST LOW 20 MIN: CPT | Performed by: STUDENT IN AN ORGANIZED HEALTH CARE EDUCATION/TRAINING PROGRAM

## 2024-12-06 PROCEDURE — 99211 OFF/OP EST MAY X REQ PHY/QHP: CPT | Performed by: OBSTETRICS & GYNECOLOGY

## 2024-12-06 PROCEDURE — 36415 COLL VENOUS BLD VENIPUNCTURE: CPT

## 2024-12-06 PROCEDURE — 36415 COLL VENOUS BLD VENIPUNCTURE: CPT | Performed by: STUDENT IN AN ORGANIZED HEALTH CARE EDUCATION/TRAINING PROGRAM

## 2024-12-06 RX ORDER — DIPHENHYDRAMINE HCL 25 MG
25 CAPSULE ORAL ONCE
Status: COMPLETED | OUTPATIENT
Start: 2024-12-06 | End: 2024-12-06

## 2024-12-06 RX ORDER — METOCLOPRAMIDE 10 MG/1
10 TABLET ORAL ONCE
Status: COMPLETED | OUTPATIENT
Start: 2024-12-06 | End: 2024-12-06

## 2024-12-06 RX ORDER — LABETALOL 200 MG/1
400 TABLET, FILM COATED ORAL 2 TIMES DAILY
Qty: 90 TABLET | Refills: 2 | Status: SHIPPED | OUTPATIENT
Start: 2024-12-06

## 2024-12-06 RX ORDER — LABETALOL 200 MG/1
200 TABLET, FILM COATED ORAL 2 TIMES DAILY
Status: DISCONTINUED | OUTPATIENT
Start: 2024-12-06 | End: 2024-12-06 | Stop reason: HOSPADM

## 2024-12-06 RX ORDER — LABETALOL 200 MG/1
200 TABLET, FILM COATED ORAL ONCE
Status: COMPLETED | OUTPATIENT
Start: 2024-12-06 | End: 2024-12-06

## 2024-12-06 SDOH — HEALTH STABILITY: MENTAL HEALTH: WERE YOU ABLE TO COMPLETE ALL THE BEHAVIORAL HEALTH SCREENINGS?: YES

## 2024-12-06 SDOH — SOCIAL STABILITY: SOCIAL INSECURITY: PHYSICAL ABUSE: DENIES

## 2024-12-06 SDOH — SOCIAL STABILITY: SOCIAL INSECURITY: VERBAL ABUSE: DENIES

## 2024-12-06 SDOH — SOCIAL STABILITY: SOCIAL INSECURITY: DOES ANYONE TRY TO KEEP YOU FROM HAVING/CONTACTING OTHER FRIENDS OR DOING THINGS OUTSIDE YOUR HOME?: NO

## 2024-12-06 SDOH — SOCIAL STABILITY: SOCIAL INSECURITY: HAVE YOU HAD THOUGHTS OF HARMING ANYONE ELSE?: NO

## 2024-12-06 SDOH — SOCIAL STABILITY: SOCIAL INSECURITY: DO YOU FEEL ANYONE HAS EXPLOITED OR TAKEN ADVANTAGE OF YOU FINANCIALLY OR OF YOUR PERSONAL PROPERTY?: NO

## 2024-12-06 SDOH — ECONOMIC STABILITY: HOUSING INSECURITY: DO YOU FEEL UNSAFE GOING BACK TO THE PLACE WHERE YOU ARE LIVING?: NO

## 2024-12-06 SDOH — SOCIAL STABILITY: SOCIAL INSECURITY: ARE YOU OR HAVE YOU BEEN THREATENED OR ABUSED PHYSICALLY, EMOTIONALLY, OR SEXUALLY BY ANYONE?: NO

## 2024-12-06 SDOH — HEALTH STABILITY: MENTAL HEALTH: HAVE YOU USED ANY PRESCRIPTION DRUGS OTHER THAN PRESCRIBED IN THE PAST 12 MONTHS?: NO

## 2024-12-06 SDOH — HEALTH STABILITY: MENTAL HEALTH: NON-SPECIFIC ACTIVE SUICIDAL THOUGHTS (PAST 1 MONTH): NO

## 2024-12-06 SDOH — SOCIAL STABILITY: SOCIAL INSECURITY: ABUSE SCREEN: ADULT

## 2024-12-06 SDOH — HEALTH STABILITY: MENTAL HEALTH: WISH TO BE DEAD (PAST 1 MONTH): NO

## 2024-12-06 SDOH — HEALTH STABILITY: MENTAL HEALTH: SUICIDAL BEHAVIOR (LIFETIME): NO

## 2024-12-06 SDOH — SOCIAL STABILITY: SOCIAL INSECURITY: HAS ANYONE EVER THREATENED TO HURT YOUR FAMILY OR YOUR PETS?: NO

## 2024-12-06 SDOH — SOCIAL STABILITY: SOCIAL INSECURITY: ARE THERE ANY APPARENT SIGNS OF INJURIES/BEHAVIORS THAT COULD BE RELATED TO ABUSE/NEGLECT?: NO

## 2024-12-06 SDOH — HEALTH STABILITY: MENTAL HEALTH: HAVE YOU USED ANY SUBSTANCES (CANABIS, COCAINE, HEROIN, HALLUCINOGENS, INHALANTS, ETC.) IN THE PAST 12 MONTHS?: NO

## 2024-12-06 ASSESSMENT — PAIN SCALES - GENERAL
PAINLEVEL_OUTOF10: 0 - NO PAIN
PAINLEVEL_OUTOF10: 2

## 2024-12-06 ASSESSMENT — LIFESTYLE VARIABLES
AUDIT-C TOTAL SCORE: 0
AUDIT-C TOTAL SCORE: 0
HOW MANY STANDARD DRINKS CONTAINING ALCOHOL DO YOU HAVE ON A TYPICAL DAY: PATIENT DOES NOT DRINK
HOW OFTEN DO YOU HAVE 6 OR MORE DRINKS ON ONE OCCASION: NEVER
SKIP TO QUESTIONS 9-10: 1
HOW OFTEN DO YOU HAVE A DRINK CONTAINING ALCOHOL: NEVER

## 2024-12-06 ASSESSMENT — PATIENT HEALTH QUESTIONNAIRE - PHQ9
1. LITTLE INTEREST OR PLEASURE IN DOING THINGS: NEARLY EVERY DAY
SUM OF ALL RESPONSES TO PHQ9 QUESTIONS 1 & 2: 6
2. FEELING DOWN, DEPRESSED OR HOPELESS: NEARLY EVERY DAY

## 2024-12-06 ASSESSMENT — PAIN DESCRIPTION - DESCRIPTORS: DESCRIPTORS: ACHING

## 2024-12-06 NOTE — H&P
OB Triage H&P    Assessment/Plan    Austin Argueta is a 29 y.o.  at 37w4d, MARIFER: 2024, by Last Menstrual Period, who presents to triage with elevated BP PP in s/o known gHTN.    Plan    gHTN  - Bps cycled in triage, only 1 non-sustained severe range BP otherwise normotensive to mild range  - started on labetalol 400 mg bid, RX Sent to pharmacy  - HELLP labs wnl  - HA resolved after reglan, benadryl    Dispo  -Patient appropriate for discharge home, agrees with plan  -Return precautions discussed     Discussed plan and reviewed with: Amadeo Osborn MD, PGY-4     Patient seen and evaluated Agree with assessment above, edits made within text.  Karen Ramires MD    PM Update: Signed out from day team. BP trend observed and stable for discharge home. Asymptomatic at time of discharge. Plan of care as discussed above.    Mesfin Alexander MD PGY-4      Signed out to Dr. Ramires to follow-up labs and resolution of headache  Pregnancy Problems (from 24 to present)       Problem Noted Diagnosed Resolved    37 weeks gestation of pregnancy ([facility]) 2024 by Negar Collins MD  No    Priority:  Medium       Gestational hypertension, third trimester ([facility]) 2024 by Tete De La Rosa MD  No    Priority:  Medium       Anemia affecting pregnancy in third trimester ([facility]) 2024 by Tete De La Rosa MD  No    Priority:  Medium       Overview Signed 2024 12:24 PM by Tete De La Rosa MD     Hgb 10.2 on initial transfer labs.   PO iron, miralax for constipation         36 weeks gestation of pregnancy ([facility]) 2024 by Tete De La Rosa MD  No    Priority:  Medium       Overview Addendum 2024 11:18 AM by Tete De La Rosa MD     Desired provider in labor: [] CNM  [x] Physician  [x] Blood Products: [x] Yes, accepts [] No, needs counseling  [x] Initial BMI: Could not be calculated   [x] Prenatal Labs: reviewed  [] Cervical Cancer Screening: unable to see in chart review  but pt reported normal 2024  [x] Rh status: +  [] Genetic Screening:    [] NT US: (11-13 wks)  [] Baby ASA (if indicated):  [x] Pregnancy dated by: LMP cw OSH dating (& cw our 34wk scan)    [x] Anatomy US: (19-20 wks): late anatomy at [facility]  [] Federal Sterilization consent signed (if indicated):  [x] 1hr GCT at 24-28wks: 111 (reviewed in chart)  [] Rhogam (if indicated):   [] Fetal Surveillance (if indicated):  [x] Tdap (27-32 wks, may be given up to 36 wks if initial window missed):   [x] RSV (32-36 wks) (Sept. to end ):   [] Flu Vaccine: uncertain    [] Breastfeeding:  [] Postpartum Birth control method: declines  [x] GBS at 36 - 37 wks: neg  [x] IOL vs. Expectant management:  delivery at 38wks  [x] Mode of delivery ( anticipated ):          Short cervical length during pregnancy in third trimester 2024 by Tete De La Rosa MD  No    Priority:  Medium       Obesity in pregnancy ([facility]) 2024 by Tete De La Rosa MD  No    Priority:  Medium       Dichorionic diamniotic twin pregnancy in third trimester ([facility]) 2024 by Tete De La Rosa MD  No    Priority:  Medium       Overview Signed 2024 12:27 PM by Tete De La Rosa MD     Cephalic/transverse. Concordant growth at 34 weeks, repeat evaluation scheduled.   Would desires IOL & breech extraction if needed.                  Subjective   30 yo  s/p  of di/di twins on . She had gHTN during her admission. Today she had an elevated BP in the office today of 152/93. She has a mild headache but says it feels much less severe than her normal migraines. She already took tylenol. IBU at 11 this AM. She denies CP, SOB, blurry vision, or RUQ pain. She is otherwise meeting all other post-partum milestones.    Pregnancy notables:   Di/di twins   gHTN   Anemia without PO iron T&C 1u  Short cervix    Prenatal Provider Rj    OB History    Para Term  AB Living   1 1 1 0 0 2   SAB IAB Ectopic Multiple Live  Births   0 0 0 1 2      # Outcome Date GA Lbr Chidi/2nd Weight Sex Type Anes PTL Lv   1A Term 11/30/24 37w4d 17:16 / 00:49 2.55 kg F Vag-Spont EPI  ADAM      Complications: Uterine Atony      Name: Louie Argueta      Apgar1: 8  Apgar5: 9   1B Term 11/30/24 37w4d 17:16 / 01:21 2.8 kg M Vag-Spont EPI  ADAM      Complications: Uterine Atony      Name: Ethel Argueta      Apgar1: 8  Apgar5: 9       No past surgical history on file.    Social History     Tobacco Use    Smoking status: Never    Smokeless tobacco: Never   Substance Use Topics    Alcohol use: Not Currently       No Known Allergies    Medications Prior to Admission   Medication Sig Dispense Refill Last Dose/Taking    acetaminophen (Tylenol) 325 mg tablet Take 3 tablets (975 mg) by mouth every 6 hours. 360 tablet 0 12/6/2024 at 11:00 AM    ferrous sulfate, 325 mg ferrous sulfate, (FeroSuL) tablet Take 1 tablet (325 mg) by mouth once daily with breakfast. 30 tablet 2 12/6/2024    ibuprofen 600 mg tablet Take 1 tablet (600 mg) by mouth every 6 hours. 120 tablet 0 12/6/2024 at  1:10 AM    PNV-DHA 27 mg iron-1 mg -300 mg capsule TAKE 1 CAPSULE BY ORAL ROUTE EVERY DAY (MAY SUBSTITUTE PER INSURANCE)   12/6/2024    polyethylene glycol (Glycolax, Miralax) 17 gram/dose powder Mix 17 g of powder and drink once daily. Dissolve into 8 oz of liquid 510 g 0 12/6/2024    prenatal vitamin, iron-folic, 27 mg iron-800 mcg folic acid tablet Take 1 tablet by mouth once daily. 30 tablet 1 12/6/2024    blood pressure monitor (Blood Pressure Kit) kit 1 each 2 times a day. 1 kit 0      Objective     Last Vitals  Temp Pulse Resp BP MAP O2 Sat   36.6 °C (97.9 °F) 66 16 (!) 154/84 114 96 %     Blood Pressures         12/6/2024  1353 12/6/2024  1409 12/6/2024  1424 12/6/2024  1439    BP: 151/86 141/85 166/94 154/84             Physical Exam  General: NAD, mood appropriate  Cardiopulmonary: warm and well perfused, breathing comfortably on room air  Abdomen:  non-tender  Extremities: Symmetric, minimal edema      Labs in chart were reviewed.   Results from last 7 days   Lab Units 12/01/24  0537   WBC AUTO x10*3/uL 16.7*   HEMOGLOBIN g/dL 7.4*   HEMATOCRIT % 23.5*   PLATELETS AUTO x10*3/uL 250        Prenatal labs reviewed, not remarkable.

## 2024-12-10 LAB
LABORATORY COMMENT REPORT: NORMAL
PATH REPORT.FINAL DX SPEC: NORMAL
PATH REPORT.GROSS SPEC: NORMAL
PATH REPORT.RELEVANT HX SPEC: NORMAL
PATH REPORT.TOTAL CANCER: NORMAL

## 2025-01-02 NOTE — PROGRESS NOTES
"ERAD Initial Patient Visit    Austin Argueta is a 29 y.o. year old , referred by Dr. Miranda for a 3b perineal with deep sulcal laceration following vaginal delivery of twins (boy and girl) on date 24.     Delivery details:   - Gestational age: 37w4d  - Delivery type:   - Infant weight: 2.55kg/2.8 kg  - Laceration type: deep sulcal with 3b perineal      Delivery/Procedure note reviewed:   Episiotomy: None  Perineal laceration: 3rd, 3b  Periurethral laceration?: Yes  Periurethral laceration location: right  Periurethral laceration repaired?: No  Repair suture: 2-0 Synthetic Suture    Uncomplicated  x2. After delivery of twin A, cervix checked and found to be 7 cm. Shortly after, found to be complete, and twin B delivered without issue. Placenta delivered spontaneously and intact with gentle traction on umbilical cord. Bimanual exam performed and clot evacuated. Cytotec given. Tone improved.     3b laceration extending from perineum into deep R sulcal. Rectal exams performed intermittently throughout repair. Abx given. Recommend ERAD clinic for recovery.     Postpartum recovery:  - Vaginal pain? Sometimes with wiping at laceration site  - Rectal pain? no  - For pain: tylenol/motrin as needed  - For bowels: takes Miralax every few days  - Urinary incontinence? No  - Bowel leakage? No  - Returned to intercourse? No  - Formula/breast feeding Formula feeding  - Mood: okay, has good support at home    PHYSICAL EXAMINATION:  Patient's last menstrual period was 2024 (exact date).  Body mass index is 41.32 kg/m².  /77   Pulse 83   Temp 36.5 °C (97.7 °F) (Temporal)   Ht 1.676 m (5' 6\")   Wt 116 kg (256 lb)   LMP 2024 (Exact Date)   Breastfeeding No   BMI 41.32 kg/m²     General Appearance: well appearing  Neuro: Alert and oriented   HEENT: mucous membranes moist, neck supple  Resp: No respiratory distress, normal work of breathing  MSK: normal range of motion, gait " appropriate      Pelvic:  Chaperone for pelvic exam:   Genitourinary:  normal external genitalia, Bartholin's glands, Onekama's glands negative,   Urethra normal meatus, non-tender, no periurethral mass  Vaginal mucosa  normal  Cervix  not assessed  Uterus not assessed  Adnexae not assessed    Perineum: well healed laceration, well approximated, no granulation tissue, no evidence of suture bridging, no fistulous connection noted, small amount of suture burden noted along right sulcal repair      POP-Q (in supine position):  No prolapse    Rectal: no hemorrhoids, fissures or masses. Normal squeeze strength and normal resting tone. No evidence of suture transgression, wound breakdown or fistulous connection with vagina.    PVR (by ultrasound): 13      IMPRESSION AND PLAN:  Austin Argueta is a 29 y.o. year old, here for evaluation for 3b perineal/deep right sulcal laceration    3b perineal laceration  Deep right sulcal laceration  - reviewed purpose of ERAD clinic to closely monitor wound healing and identify issues early before they progress  - reviewed recommendations for bowel regimen, titrating to daily soft BM  - healing appropriately on exam today  - discussed follow-up as needed if vaginal burning at laceration site doesn't improve or if she has concerns  - reviewed warning signs: worsening pain, foul smelling discharge, yoana fecal incontinence, etc  - discussed delivery recommendations for any future pregnancies. Given no bowel symptoms, it is reasonable to undergo vaginal delivery again. If patient develops bowel control symptoms or desires , this would also be reasonable. Offered further discussion on delivery planning in the future if patient desires.      PFPT recommendation discussed and referral placed today.     All questions and concerns were answered and addressed.  The patient expressed understanding and agrees with the plan.     Yolanda Watkins MD

## 2025-01-03 ENCOUNTER — OFFICE VISIT (OUTPATIENT)
Dept: OBSTETRICS AND GYNECOLOGY | Facility: CLINIC | Age: 30
End: 2025-01-03
Payer: COMMERCIAL

## 2025-01-03 VITALS
HEIGHT: 66 IN | WEIGHT: 256 LBS | TEMPERATURE: 97.7 F | DIASTOLIC BLOOD PRESSURE: 77 MMHG | BODY MASS INDEX: 41.14 KG/M2 | SYSTOLIC BLOOD PRESSURE: 116 MMHG | HEART RATE: 83 BPM

## 2025-01-03 PROCEDURE — 99214 OFFICE O/P EST MOD 30 MIN: CPT | Mod: GC | Performed by: STUDENT IN AN ORGANIZED HEALTH CARE EDUCATION/TRAINING PROGRAM

## 2025-01-03 ASSESSMENT — ENCOUNTER SYMPTOMS
OCCASIONAL FEELINGS OF UNSTEADINESS: 0
DEPRESSION: 0
LOSS OF SENSATION IN FEET: 0

## 2025-01-03 ASSESSMENT — COLUMBIA-SUICIDE SEVERITY RATING SCALE - C-SSRS
6. HAVE YOU EVER DONE ANYTHING, STARTED TO DO ANYTHING, OR PREPARED TO DO ANYTHING TO END YOUR LIFE?: NO
2. HAVE YOU ACTUALLY HAD ANY THOUGHTS OF KILLING YOURSELF?: NO
1. IN THE PAST MONTH, HAVE YOU WISHED YOU WERE DEAD OR WISHED YOU COULD GO TO SLEEP AND NOT WAKE UP?: NO

## 2025-01-03 ASSESSMENT — PATIENT HEALTH QUESTIONNAIRE - PHQ9
2. FEELING DOWN, DEPRESSED OR HOPELESS: NOT AT ALL
SUM OF ALL RESPONSES TO PHQ9 QUESTIONS 1 AND 2: 0
1. LITTLE INTEREST OR PLEASURE IN DOING THINGS: NOT AT ALL

## 2025-01-03 ASSESSMENT — PAIN SCALES - GENERAL: PAINLEVEL_OUTOF10: 0-NO PAIN

## 2025-01-21 ENCOUNTER — POSTPARTUM VISIT (OUTPATIENT)
Dept: OBSTETRICS AND GYNECOLOGY | Facility: HOSPITAL | Age: 30
End: 2025-01-21
Payer: COMMERCIAL

## 2025-01-21 VITALS
HEART RATE: 75 BPM | BODY MASS INDEX: 40.97 KG/M2 | HEIGHT: 67 IN | WEIGHT: 261 LBS | DIASTOLIC BLOOD PRESSURE: 85 MMHG | SYSTOLIC BLOOD PRESSURE: 131 MMHG

## 2025-01-21 PROBLEM — O99.210 OBESITY IN PREGNANCY (HHS-HCC): Status: RESOLVED | Noted: 2024-11-05 | Resolved: 2025-01-21

## 2025-01-21 PROBLEM — O30.043 DICHORIONIC DIAMNIOTIC TWIN PREGNANCY IN THIRD TRIMESTER (HHS-HCC): Status: RESOLVED | Noted: 2024-11-05 | Resolved: 2025-01-21

## 2025-01-21 PROBLEM — Z3A.37 37 WEEKS GESTATION OF PREGNANCY (HHS-HCC): Status: RESOLVED | Noted: 2024-11-29 | Resolved: 2025-01-21

## 2025-01-21 PROBLEM — O99.013 ANEMIA AFFECTING PREGNANCY IN THIRD TRIMESTER (HHS-HCC): Status: RESOLVED | Noted: 2024-11-11 | Resolved: 2025-01-21

## 2025-01-21 PROBLEM — O13.3 GESTATIONAL HYPERTENSION, THIRD TRIMESTER (HHS-HCC): Status: RESOLVED | Noted: 2024-11-25 | Resolved: 2025-01-21

## 2025-01-21 PROBLEM — O26.873 SHORT CERVICAL LENGTH DURING PREGNANCY IN THIRD TRIMESTER: Status: RESOLVED | Noted: 2024-11-05 | Resolved: 2025-01-21

## 2025-01-21 PROBLEM — Z3A.36 36 WEEKS GESTATION OF PREGNANCY (HHS-HCC): Status: RESOLVED | Noted: 2024-11-05 | Resolved: 2025-01-21

## 2025-01-21 PROCEDURE — 99212 OFFICE O/P EST SF 10 MIN: CPT

## 2025-01-21 SDOH — ECONOMIC STABILITY: FOOD INSECURITY: WITHIN THE PAST 12 MONTHS, THE FOOD YOU BOUGHT JUST DIDN'T LAST AND YOU DIDN'T HAVE MONEY TO GET MORE.: NEVER TRUE

## 2025-01-21 SDOH — ECONOMIC STABILITY: TRANSPORTATION INSECURITY
IN THE PAST 12 MONTHS, HAS THE LACK OF TRANSPORTATION KEPT YOU FROM MEDICAL APPOINTMENTS OR FROM GETTING MEDICATIONS?: NO

## 2025-01-21 SDOH — ECONOMIC STABILITY: FOOD INSECURITY: WITHIN THE PAST 12 MONTHS, YOU WORRIED THAT YOUR FOOD WOULD RUN OUT BEFORE YOU GOT MONEY TO BUY MORE.: NEVER TRUE

## 2025-01-21 SDOH — ECONOMIC STABILITY: TRANSPORTATION INSECURITY
IN THE PAST 12 MONTHS, HAS LACK OF TRANSPORTATION KEPT YOU FROM MEETINGS, WORK, OR FROM GETTING THINGS NEEDED FOR DAILY LIVING?: NO

## 2025-01-21 ASSESSMENT — LIFESTYLE VARIABLES
HOW MANY STANDARD DRINKS CONTAINING ALCOHOL DO YOU HAVE ON A TYPICAL DAY: PATIENT DOES NOT DRINK
HOW OFTEN DO YOU HAVE SIX OR MORE DRINKS ON ONE OCCASION: NEVER
AUDIT-C TOTAL SCORE: 0
HOW OFTEN DO YOU HAVE A DRINK CONTAINING ALCOHOL: NEVER
SKIP TO QUESTIONS 9-10: 1

## 2025-01-21 ASSESSMENT — EDINBURGH POSTNATAL DEPRESSION SCALE (EPDS)
I HAVE BEEN ANXIOUS OR WORRIED FOR NO GOOD REASON: HARDLY EVER
TOTAL SCORE: 3
THINGS HAVE BEEN GETTING ON TOP OF ME: NO, MOST OF THE TIME I HAVE COPED QUITE WELL
I HAVE BEEN ABLE TO LAUGH AND SEE THE FUNNY SIDE OF THINGS: AS MUCH AS I ALWAYS COULD
I HAVE FELT SAD OR MISERABLE: NO, NOT AT ALL
I HAVE BLAMED MYSELF UNNECESSARILY WHEN THINGS WENT WRONG: NOT VERY OFTEN
I HAVE BEEN SO UNHAPPY THAT I HAVE HAD DIFFICULTY SLEEPING: NOT AT ALL
I HAVE LOOKED FORWARD WITH ENJOYMENT TO THINGS: AS MUCH AS I EVER DID
I HAVE BEEN SO UNHAPPY THAT I HAVE BEEN CRYING: NO, NEVER
THE THOUGHT OF HARMING MYSELF HAS OCCURRED TO ME: NEVER
I HAVE FELT SCARED OR PANICKY FOR NO GOOD REASON: NO, NOT AT ALL

## 2025-01-21 ASSESSMENT — SOCIAL DETERMINANTS OF HEALTH (SDOH)
WITHIN THE LAST YEAR, HAVE TO BEEN RAPED OR FORCED TO HAVE ANY KIND OF SEXUAL ACTIVITY BY YOUR PARTNER OR EX-PARTNER?: NO
WITHIN THE LAST YEAR, HAVE YOU BEEN HUMILIATED OR EMOTIONALLY ABUSED IN OTHER WAYS BY YOUR PARTNER OR EX-PARTNER?: NO
WITHIN THE LAST YEAR, HAVE YOU BEEN KICKED, HIT, SLAPPED, OR OTHERWISE PHYSICALLY HURT BY YOUR PARTNER OR EX-PARTNER?: NO
WITHIN THE LAST YEAR, HAVE YOU BEEN AFRAID OF YOUR PARTNER OR EX-PARTNER?: NO

## 2025-01-21 ASSESSMENT — PAIN SCALES - GENERAL: PAINLEVEL_OUTOF10: 0-NO PAIN

## 2025-01-21 NOTE — PROGRESS NOTES
Subjective   29 y.o.  presenting for postpartum follow-up     Delivery Date:      Cece Argueta [[id number]]   2024      Mic Argueta [[id number]]   2024  Gestational Age: 37w4d   Type of Delivery:      Cece Argueta [[id number]]   Vaginal, Spontaneous      Mic Argueta [[id number]]   Vaginal, Spontaneous     Pregnancy Problems (from 24 to 25)       Problem Noted Diagnosed Resolved    37 weeks gestation of pregnancy ([facility]) 2024 by Negar Collins MD  2025 by Peyton Lawson LPN    Gestational hypertension, third trimester ([facility]) 2024 by Tete De La Rosa MD  2025 by Peyton Lawson LPN    Anemia affecting pregnancy in third trimester ([facility]) 2024 by Tete De La Rosa MD  2025 by Peyton Lawson LPN    Overview Signed 2024 12:24 PM by Tete De La Rosa MD     Hgb 10.2 on initial transfer labs.   PO iron, miralax for constipation         36 weeks gestation of pregnancy ([facility]) 2024 by Tete De La Rosa MD  2025 by Peyton Lawson LPN    Overview Addendum 2024 11:18 AM by Tete De La Rosa MD     Desired provider in labor: [] CNM  [x] Physician  [x] Blood Products: [x] Yes, accepts [] No, needs counseling  [x] Initial BMI: Could not be calculated   [x] Prenatal Labs: reviewed  [] Cervical Cancer Screening: unable to see in chart review but pt reported normal 2024  [x] Rh status: +  [] Genetic Screening:    [] NT US: (11-13 wks)  [] Baby ASA (if indicated):  [x] Pregnancy dated by: LMP cw OSH dating (& cw our 34wk scan)    [x] Anatomy US: (19-20 wks): late anatomy at [facility]  [] Federal Sterilization consent signed (if indicated):  [x] 1hr GCT at 24-28wks: 111 (reviewed in chart)  [] Rhogam (if indicated):   [] Fetal Surveillance (if indicated):  [x] Tdap (27-32 wks, may be given up to 36 wks if initial window missed):   [x] RSV (32-36 wks) (Sept. to end ):   [] Flu Vaccine: uncertain    []  "Breastfeeding:  [] Postpartum Birth control method: declines  [x] GBS at 36 - 37 wks: neg  [x] IOL vs. Expectant management:  delivery at 38wks  [x] Mode of delivery ( anticipated ):          Short cervical length during pregnancy in third trimester 2024 by Tete De La Rosa MD  2025 by Peyton Lawson LPN    Obesity in pregnancy ([facility]) 2024 by Tete De La Rosa MD  2025 by Peyton Lawson LPN    Dichorionic diamniotic twin pregnancy in third trimester ([facility]) 2024 by Tete De La Rosa MD  2025 by Peyton Lawson LPN    Overview Signed 2024 12:27 PM by Tete De La Rosa MD     Cephalic/transverse. Concordant growth at 34 weeks, repeat evaluation scheduled.   Would desires IOL & breech extraction if needed.                  Concerns: N/A    Pain: controlled  Lacerations: 3rd degree and periurethral  Lochia: ceased; has returned to menses 1 week ago  Sexual Intimacy: No  Contraceptive Method:  abstinence  Feeding Method: She is bottle feeding. no breast or nursing problems  Lactation Consult Needed?: No    Birth Trauma: No  Bonding with Baby: well with babies      Cece Argueta [[id number]]   Mic Ziegler [[id number]]   Mic    Mood:   Postpartum Depression: Low Risk  (2025)    [place]  Depression Scale     Last EPDS Total Score: 3     Last EPDS Self Harm Result: Never     Last pap: 3/2024 in [facility] per patient; WNL due in     Objective    /85 (BP Location: Left arm, Patient Position: Sitting, BP Cuff Size: Adult long)   Pulse 75   Ht 1.702 m (5' 7\")   Wt 118 kg (261 lb)   LMP 2025 (Exact Date)   Breastfeeding No   BMI 40.88 kg/m²    Physical Exam  Constitutional:       Appearance: Normal appearance.   Genitourinary:      Vulva and rectum normal.      Genitourinary Comments: 3rd degree; 3b laceration well healed externally; no signs or symptoms of infection noted    Vaginal walls and floor well healed " from periurethral and right sulcal tear.  Patient remains slightly tender to touch upon palpation along the vaginal walls; no sutures felt during exam today   HENT:      Head: Normocephalic and atraumatic.      Mouth/Throat:      Mouth: Mucous membranes are moist.   Cardiovascular:      Rate and Rhythm: Normal rate and regular rhythm.      Heart sounds: Normal heart sounds.   Pulmonary:      Effort: Pulmonary effort is normal.      Breath sounds: Normal breath sounds.   Abdominal:      Comments: Uterus not palpable   Musculoskeletal:         General: Normal range of motion.      Cervical back: Normal range of motion and neck supple.   Neurological:      Mental Status: She is alert and oriented to person, place, and time.   Skin:     General: Skin is warm and dry.   Psychiatric:         Mood and Affect: Mood normal.         Behavior: Behavior normal.         Thought Content: Thought content normal.         Judgment: Judgment normal.          Plan    Advised to call office for breast complaints, abnormal bleeding, mood changes, or other concerning symptoms.   Cleared to resume normal activity as desired    Problem List Items Addressed This Visit             ICD-10-CM    Routine postpartum follow-up - Primary Z39.2       Caron Austin, KURTIS-NATALIA

## 2025-06-06 ENCOUNTER — APPOINTMENT (OUTPATIENT)
Dept: PRIMARY CARE | Facility: CLINIC | Age: 30
End: 2025-06-06
Payer: COMMERCIAL

## 2025-06-06 VITALS
DIASTOLIC BLOOD PRESSURE: 78 MMHG | OXYGEN SATURATION: 95 % | WEIGHT: 284.5 LBS | SYSTOLIC BLOOD PRESSURE: 118 MMHG | HEART RATE: 77 BPM | BODY MASS INDEX: 44.65 KG/M2 | HEIGHT: 67 IN

## 2025-06-06 DIAGNOSIS — Z00.00 ANNUAL PHYSICAL EXAM: Primary | ICD-10-CM

## 2025-06-06 PROCEDURE — 3008F BODY MASS INDEX DOCD: CPT

## 2025-06-06 PROCEDURE — 1036F TOBACCO NON-USER: CPT

## 2025-06-06 PROCEDURE — G8433 SCR FOR DEP NOT CPT DOC RSN: HCPCS

## 2025-06-06 PROCEDURE — 99385 PREV VISIT NEW AGE 18-39: CPT

## 2025-06-06 ASSESSMENT — PATIENT HEALTH QUESTIONNAIRE - PHQ9
SUM OF ALL RESPONSES TO PHQ9 QUESTIONS 1 AND 2: 0
2. FEELING DOWN, DEPRESSED OR HOPELESS: NOT AT ALL
1. LITTLE INTEREST OR PLEASURE IN DOING THINGS: NOT AT ALL

## 2025-06-06 ASSESSMENT — COLUMBIA-SUICIDE SEVERITY RATING SCALE - C-SSRS
1. IN THE PAST MONTH, HAVE YOU WISHED YOU WERE DEAD OR WISHED YOU COULD GO TO SLEEP AND NOT WAKE UP?: NO
6. HAVE YOU EVER DONE ANYTHING, STARTED TO DO ANYTHING, OR PREPARED TO DO ANYTHING TO END YOUR LIFE?: NO
2. HAVE YOU ACTUALLY HAD ANY THOUGHTS OF KILLING YOURSELF?: NO

## 2025-06-06 NOTE — PROGRESS NOTES
"Subjective   Patient ID: Austin Argueta is a 29 y.o. female who presents for New Patient Visit (/).    HPI  New patient here for physical and to establish care.  She is doing well and is 6 months postpartum with twins.  History of postpartum hypertension and was taking labetalol.  Has not taken medication since February.  She has a blood pressure monitor at home but has not been checking her blood pressures.  Blood pressure at goal in office, but advised patient to begin checking blood pressure at least 1-2 times weekly to monitor.  She does complain of a \"lump\" to her right areola area.  It is pea-sized now, but 1 month ago it started small and got larger and became very painful.  No drainage or pain noted at present.  Advised patient to keep an eye on it and to call office if that should happen again.  She also complains of several different body aches after walking around in the mall.  Probably deconditioning after pregnancy and advised to begin exercise at least 30 minutes daily.    Review of Systems  All pertinent positive symptoms are included in the history of present illness.  All other systems have been reviewed and are negative and noncontributory to this patient's current ailments.    No current outpatient medications    Objective   Visit Vitals  /78   Pulse 77   Ht 1.702 m (5' 7\")   Wt 129 kg (284 lb 8 oz)   SpO2 95%   BMI 44.56 kg/m²   OB Status Having periods   Smoking Status Never   BSA 2.47 m²       Physical Exam  CONSTITUTIONAL - well nourished, well developed, looks like stated age, in no acute distress.  SKIN - normal skin color and pigmentation, normal skin turgor without rash, lesions, or nodules visualized  HEAD - no trauma, normocephalic  EYES - pupils are equal and reactive to light, and extraocular muscles are intact  ENT - TM's intact, no signs of infection, uvula midline, and throat normal, no exudate, nasal passage without discharge.  NECK - supple without rigidity, no neck mass " was observed, no thyromegaly or thyroid nodules  CHEST - clear to auscultation, no wheezing, no crackles and no rales, good effort  CARDIAC - regular rate and regular rhythm, no skipped beats, no murmur  ABDOMEN - no organomegaly, soft, nontender, nondistended, normal bowel sounds, no guarding/rebound/rigidity.  EXTREMITIES - no obvious or evident edema, no obvious or evident deformities  NEUROLOGICAL - normal gait, normal balance, no ataxia, alert, oriented  PSYCHIATRIC - alert, pleasant and age-appropriate  IMMUNOLOGIC - no cervical lymphadenopathy     Assessment/Plan   Assessment & Plan  Annual physical exam  -Health Maintenance:  -I have ordered lab work to be completed at this time. Please complete and my office will call you with the results.  -Please continue to eat a well-rounded diet rich in protein, fruits, and vegetables. Try to find food options that are low in sodium and carbohydrates.   -Try to increase your level of exercise by participating in physical activities for at least 30 minutes a day for at least 5 days per week. I gave you a pamphlet of exercises you may perform at home   -Please continue scheduling and attending all appointments with specialists per their request.      Orders Placed This Encounter   Procedures    Comprehensive Metabolic Panel    Lipid Panel    Hemoglobin A1C    Vitamin D 25-Hydroxy,Total (for eval of Vitamin D levels)    TSH with reflex to Free T4 if abnormal    CBC and Auto Differential     1. HM  -CBC, CMP, Lipid panel, Vit D, TSH with reflex T4  -Vaccines:       Flu: Defer      Tdap: 11/05/2024  -Last PAP: per gyn  Last CPE: 06/06/2025     Return to office in 1 year or sooner if needed.  If you should experience concerning symptoms, go to the nearest Emergency Department.      KURTIS Allen-CNP

## 2025-06-07 LAB
25(OH)D3+25(OH)D2 SERPL-MCNC: 21 NG/ML (ref 30–100)
ALBUMIN SERPL-MCNC: 4.1 G/DL (ref 3.6–5.1)
ALP SERPL-CCNC: 51 U/L (ref 31–125)
ALT SERPL-CCNC: 7 U/L (ref 6–29)
ANION GAP SERPL CALCULATED.4IONS-SCNC: 8 MMOL/L (CALC) (ref 7–17)
AST SERPL-CCNC: 14 U/L (ref 10–30)
BASOPHILS # BLD AUTO: 41 CELLS/UL (ref 0–200)
BASOPHILS NFR BLD AUTO: 0.7 %
BILIRUB SERPL-MCNC: 0.5 MG/DL (ref 0.2–1.2)
BUN SERPL-MCNC: 15 MG/DL (ref 7–25)
CALCIUM SERPL-MCNC: 9.1 MG/DL (ref 8.6–10.2)
CHLORIDE SERPL-SCNC: 105 MMOL/L (ref 98–110)
CHOLEST SERPL-MCNC: 223 MG/DL
CHOLEST/HDLC SERPL: 4.1 (CALC)
CO2 SERPL-SCNC: 27 MMOL/L (ref 20–32)
CREAT SERPL-MCNC: 0.95 MG/DL (ref 0.5–0.96)
EGFRCR SERPLBLD CKD-EPI 2021: 83 ML/MIN/1.73M2
EOSINOPHIL # BLD AUTO: 110 CELLS/UL (ref 15–500)
EOSINOPHIL NFR BLD AUTO: 1.9 %
ERYTHROCYTE [DISTWIDTH] IN BLOOD BY AUTOMATED COUNT: 12.4 % (ref 11–15)
EST. AVERAGE GLUCOSE BLD GHB EST-MCNC: 100 MG/DL
EST. AVERAGE GLUCOSE BLD GHB EST-SCNC: 5.5 MMOL/L
GLUCOSE SERPL-MCNC: 91 MG/DL (ref 65–99)
HBA1C MFR BLD: 5.1 %
HCT VFR BLD AUTO: 37.3 % (ref 35–45)
HDLC SERPL-MCNC: 55 MG/DL
HGB BLD-MCNC: 12 G/DL (ref 11.7–15.5)
LDLC SERPL CALC-MCNC: 150 MG/DL (CALC)
LYMPHOCYTES # BLD AUTO: 2204 CELLS/UL (ref 850–3900)
LYMPHOCYTES NFR BLD AUTO: 38 %
MCH RBC QN AUTO: 30.1 PG (ref 27–33)
MCHC RBC AUTO-ENTMCNC: 32.2 G/DL (ref 32–36)
MCV RBC AUTO: 93.5 FL (ref 80–100)
MONOCYTES # BLD AUTO: 400 CELLS/UL (ref 200–950)
MONOCYTES NFR BLD AUTO: 6.9 %
NEUTROPHILS # BLD AUTO: 3045 CELLS/UL (ref 1500–7800)
NEUTROPHILS NFR BLD AUTO: 52.5 %
NONHDLC SERPL-MCNC: 168 MG/DL (CALC)
PLATELET # BLD AUTO: 417 THOUSAND/UL (ref 140–400)
PMV BLD REES-ECKER: 9.3 FL (ref 7.5–12.5)
POTASSIUM SERPL-SCNC: 4.2 MMOL/L (ref 3.5–5.3)
PROT SERPL-MCNC: 6.9 G/DL (ref 6.1–8.1)
RBC # BLD AUTO: 3.99 MILLION/UL (ref 3.8–5.1)
SODIUM SERPL-SCNC: 140 MMOL/L (ref 135–146)
TRIGL SERPL-MCNC: 78 MG/DL
TSH SERPL-ACNC: 2.2 MIU/L
WBC # BLD AUTO: 5.8 THOUSAND/UL (ref 3.8–10.8)

## 2025-06-10 ENCOUNTER — TELEPHONE (OUTPATIENT)
Dept: PRIMARY CARE | Facility: CLINIC | Age: 30
End: 2025-06-10
Payer: COMMERCIAL

## 2025-06-10 NOTE — TELEPHONE ENCOUNTER
Spoke to pt  ----- Message from Anne Gibbons sent at 6/8/2025  1:23 PM EDT -----  Let her know we got her blood work back and everything looks good except for her vitamin D level is low.  She should take a vitamin D supplement over-the-counter at least 2000 to 4000 units daily.    Also her cholesterol is elevated.  Her total was 223 and it should be under 200 and her bad cholesterol was 150 and it should be under 100. Lifestyle changes for elevated cholesterol levels include   diet and exercise. For exercise, we recommend at least 150 mins of moderate intensity exercise in a week (enough that your heart rate picks up and you are sweating). For diet, the mediterranean diet   has been shown to help. General recommendations beyond that include having less red meat more chicken/turkey/fish, less butter more olive oil, and less fried food more fiber and vegetables.   ----- Message -----  From: Christine BiOM Results In  Sent: 6/7/2025   7:08 AM EDT  To: KURTIS Allen-CNP

## 2026-06-05 ENCOUNTER — APPOINTMENT (OUTPATIENT)
Dept: PRIMARY CARE | Facility: CLINIC | Age: 31
End: 2026-06-05
Payer: COMMERCIAL

## (undated) DEVICE — TOWEL PACK, STERILE, 4/PACK, BLUE

## (undated) DEVICE — DRAPE PACK, CESAREAN SECTION, CUSTOM, UHC